# Patient Record
Sex: MALE | Race: WHITE | Employment: OTHER | URBAN - METROPOLITAN AREA
[De-identification: names, ages, dates, MRNs, and addresses within clinical notes are randomized per-mention and may not be internally consistent; named-entity substitution may affect disease eponyms.]

---

## 2017-01-19 ENCOUNTER — OFFICE VISIT (OUTPATIENT)
Dept: ORTHOPEDIC SURGERY | Age: 57
End: 2017-01-19

## 2017-01-19 VITALS
WEIGHT: 255 LBS | OXYGEN SATURATION: 99 % | SYSTOLIC BLOOD PRESSURE: 170 MMHG | TEMPERATURE: 98.1 F | RESPIRATION RATE: 16 BRPM | HEART RATE: 105 BPM | HEIGHT: 74 IN | DIASTOLIC BLOOD PRESSURE: 100 MMHG | BODY MASS INDEX: 32.73 KG/M2

## 2017-01-19 DIAGNOSIS — M48.061 LUMBAR STENOSIS: Primary | ICD-10-CM

## 2017-01-19 RX ORDER — METHOCARBAMOL 500 MG/1
500 TABLET, FILM COATED ORAL 4 TIMES DAILY
Qty: 120 TAB | Refills: 2 | Status: SHIPPED | OUTPATIENT
Start: 2017-01-19

## 2017-01-19 NOTE — PATIENT INSTRUCTIONS
Learning About Lumbar Epidural Steroid Injections  What is a lumbar epidural steroid injection? A doctor may give you a lumbar epidural steroid injection to try to decrease pain, tingling, or numbness in your back, buttock, or leg. These might be the result of a back or disc problem. The injection goes directly into your epidural space. This is the area in your back around your spinal cord. This injection may have both a local anesthetic and a steroid medicine. Or it may only have a steroid. Local anesthetic medicines numb your nerves right away for a short time. Steroids reduce swelling and pain. But they take a few days to start working. Some people get a series of these injections over weeks or months. How is a lumbar epidural done? The doctor may use an imaging test before or during your injection. This can be an MRI, a CT scan, or an X-ray. These tests can show where your nerve problems are. After finding the right spot, the doctor may inject a numbing medicine into the skin where you will get the steroid injection. Then he or she puts the needle for the steroid into the numbed area. You may feel some pressure. You could feel some stinging or burning during the injection. It takes about 10 to 15 minutes to get this injection. You will probably go home about 20 to 30 minutes after you get it. You will need someone to drive you home. What can you expect after a lumbar epidural?  If your injection had local anesthetic and a steroid, your legs may feel heavy or numb right after. You will probably be able to walk. But you may need to be extra careful. Take care not to lose your balance and be sure to follow your doctor's instructions. If your injection contained local anesthetic, you may feel better right away. But this pain relief will last only a few hours. Your pain will probably return. This is because the steroids have not started working yet.  Before the steroids start to work, your back may be sore for a few days. These injections don't always work. When they do, it takes 1 to 5 days. This pain relief can last for several days to a few months or longer. You may want to do less than normal for a few days. But you may also be able to return to your daily routine. Some people are dizzy or feel sick to their stomach after getting this injection. These symptoms usually do not last very long. If your pain is better, you may be able to keep doing your normal activities or physical therapy. But try not to overdo it, even if your back pain has improved a lot. If your pain is only a little better or if it comes back, your doctor may recommend another injection in a few weeks. If your pain has not changed, talk to your doctor about other treatment choices. Side effects from an epidural steroid injection include headache, fever, or infection. Serious side effects are rare. But they can include stroke, paralysis, or loss of vision. Follow-up care is a key part of your treatment and safety. Be sure to make and go to all appointments, and call your doctor if you are having problems. It's also a good idea to know your test results and keep a list of the medicines you take. Where can you learn more? Go to http://brian-tabitha.info/. Enter Astrid Mcmullen in the search box to learn more about \"Learning About Lumbar Epidural Steroid Injections. \"  Current as of: May 23, 2016  Content Version: 11.1  © 1643-7933 [employer]. Care instructions adapted under license by [employer] (which disclaims liability or warranty for this information). If you have questions about a medical condition or this instruction, always ask your healthcare professional. [place] [address] any warranty or liability for your use of this information.

## 2017-01-19 NOTE — PROGRESS NOTES
[address], [address]  [place]  Phone: [phone]  Fax: [phone]        Pawel Grimm  : 1960  PCP: Nkechi Canas MD  2017    PROGRESS NOTE      ASSESSMENT AND PLAN    Johann Copeland comes in to the office today for a prn f/u. He comes in today because he had a sudden flare up of lumbar pain 4 days ago that wraps around to his flanks bilaterally. The area is tender to palpation and he has pain with back flexion. This pain may be due to his moderately severe lumbar stenosis at L2-3. He was referred for an L1-2 interlaminar injection to see if it will provide relief. We discussed a referral to PT as another option but pt declined. Pt was also prescribed Robaxin 500 mg QID per his request.    Pt will f/u in 3 weeks or prn. Jaxson Santana was seen today for back pain. Diagnoses and all orders for this visit:    Lumbar stenosis  -     SCHEDULE SURGERY    Other orders  -     methocarbamol (ROBAXIN) 500 mg tablet; Take 1 Tab by mouth four (4) times daily. Follow-up Disposition:  Return in about 3 weeks (around 2017), or if symptoms worsen or fail to improve. HISTORY OF PRESENT ILLNESS  Johann Copeland is a 61 y.o. male. A&P / HPI from 2016:  Johann Copeland comes in to the office today for his cervical, thoracic, and lumbar MRI f/u.     Cervical MRI: Cervical intralaminar injections or an EMG/NCS of his arms were discussed as options for treating his cervical spine. Pt opted to wait for now as he does not seem to be experiencing too much discomfort in this region. He has stenosis in his cervical spine but it does not seem to be causing UMN signs at this time so surgery is not warranted at this point. Thoracic MRI does not reveal anything clinically significant.   Lumbar MRI shows potential mild arachnoiditis.     He was referred for an L1/L2 intralaminar injection as both a diagnostic and therapeutic measure for his lower back pain. If this relieves his pain, we may continue giving him more intralaminar injections periodically. If it does not relieve his pain, we may try the injections at a higher point in his spine. He was prescribed 5 days of Oxycodone 10 mg and 20 mg for his pain.     Pt will f/u in 3 weeks or prn. Updates from 01/19/17:  Pt presents for a prn f/u. He comes in today because he had a sudden flare up of lumbar pain 4 days ago that wraps around to his flanks bilaterally. The area is tender to palpation and he has pain with back flexion. This pain may be due to his moderately severe lumbar stenosis at L2-3. He is working with Dr. Arya Jett for pain management. PAST MEDICAL HISTORY   Past Medical History   Diagnosis Date    Arrhythmia 2004     SVT    Arthritis     Asthma      hx of, as a child, no inhaler    Chronic pain      lower back pain    Cirrhosis (HCC)     Depression     Encephalitis     GERD (gastroesophageal reflux disease)     Gout     Hepatitis C     Hypertension     Left rotator cuff tear     Lumbar postlaminectomy syndrome     Nausea & vomiting      pt denies nausea and vomiting after surgery today    Osteoarthritis     Osteoarthritis of left knee 10/3/2016    Weakness        Past Surgical History   Procedure Laterality Date    Hx acl reconstruction       right    Pr cardiac surg procedure unlist      Ablation of svt  2004    Pr chest surgery procedure unlisted      Hx back surgery       4 times    Pr transplant,prep donor liver, whole  February 2015    Hx lumbar fusion     . MEDICATIONS      Current Outpatient Prescriptions   Medication Sig Dispense Refill    oxyCODONE IR (OXY-IR) 30 mg immediate release tablet Take 1 Tab by mouth every six (6) hours. Max Daily Amount: 120 mg. 60 Tab 0    venlafaxine-SR (EFFEXOR XR) 150 mg capsule Take 150 mg by mouth daily.  Indications: ANXIETY WITH DEPRESSION      pregabalin (LYRICA) 75 mg capsule Take 75 mg by mouth two (2) times a day. Indications: pain      atorvastatin (LIPITOR) 10 mg tablet Take 10 mg by mouth two (2) times a day. Indications: HYPERCHOLESTEROLEMIA      CYCLOSPORINE, MODIFIED (GENGRAF PO) Take 100 mg by mouth two (2) times a day.  MYCOPHENOLATE MOFETIL (CELLCEPT PO) Take 750 mg by mouth two (2) times a day.  Omeprazole delayed release (PRILOSEC D/R) 20 mg tablet Take 20 mg by mouth daily. Indications: GASTROESOPHAGEAL REFLUX      allopurinol (ZYLOPRIM) 300 mg tablet Take 300 mg by mouth daily.  thiamine (B-1) 100 mg tablet Take 100 mg by mouth daily.  lisinopril (PRINIVIL, ZESTRIL) 10 mg tablet Take 10 mg by mouth daily. Indications: HYPERTENSION      clonazePAM (KLONOPIN) 0.5 mg tablet Take 1 mg by mouth two (2) times a day. Indications: anxiety          ALLERGIES    Allergies   Allergen Reactions    Adhesive Itching     Tears skin easily          SOCIAL HISTORY    Social History     Social History    Marital status:      Spouse name: N/A    Number of children: N/A    Years of education: N/A     Social History Main Topics    Smoking status: Never Smoker    Smokeless tobacco: Never Used    Alcohol use No      Comment: drank too much years ago; none since    Drug use: No      Comment: years ago - 1980's    Sexual activity: Yes     Other Topics Concern    None     Social History Narrative     Social History Narrative      Problem Relation Age of Onset    Heart Attack Father      1971    Heart Disease Father     Heart Disease Other     Cancer Brother          REVIEW OF SYSTEMS  Review of Systems   Constitutional: Negative for chills, diaphoresis, fever, malaise/fatigue and weight loss. Respiratory: Negative for shortness of breath. Cardiovascular: Negative for chest pain and leg swelling. Gastrointestinal: Negative for constipation, nausea and vomiting. Neurological: Negative for dizziness, tingling, seizures, loss of consciousness and headaches. Psychiatric/Behavioral: The patient does not have insomnia. PHYSICAL EXAMINATION  Visit Vitals    BP (!) 170/100 (BP 1 Location: Right arm, BP Patient Position: Sitting)    Pulse (!) 105    Temp 98.1 °F (36.7 °C) (Oral)    Resp 16    Ht 6' 2\" (1.88 m)    Wt 255 lb (115.7 kg)    SpO2 99%    BMI 32.74 kg/m2       Pain Assessment  9/2/2016   Location of Pain Back   Location Modifiers (No Data)   Severity of Pain 4   Quality of Pain Aching; Orpha Coppersmith; Other (Comment)   Quality of Pain Comment stabbing   Duration of Pain Persistent   Frequency of Pain Constant   Aggravating Factors -   Aggravating Factors Comment -   Limiting Behavior -   Relieving Factors -   Relieving Factors Comment -   Result of Injury No           Constitutional:  Well developed, well nourished, in no acute distress. Psychiatric: Affect and mood are appropriate. Integumentary: No rashes or abrasions noted on exposed areas. SPINE/MUSCULOSKELETAL EXAM    Lumbar spine:  No rash, ecchymosis, or gross obliquity.    No fasciculations.    No focal atrophy is noted.    No pain with hip ROM.    Range of motion is normal.   No tenderness to palpation at the sciatic notch.    SI joints non-tender.    Trochanters non tender.        Sensation in the bilateral legs grossly intact to light touch. Updates from 01/19/17:  Pain with back flexion. Diffuse tenderness to palpation in the lumbar region. MOTOR:      Biceps  Triceps Deltoids Wrist Ext Wrist Flex Hand Intrin   Right 5/5 5/5 5/5 5/5 5/5 5/5   Left 5/5 5/5 5/5 5/5 5/5 5/5             Hip Flex  Quads Hamstrings Ankle DF EHL Ankle PF   Right 5/5 5/5 5/5 5/5 5/5 5/5   Left 5/5 5/5 5/5 5/5 5/5 5/5     DTRs are 2+ biceps, triceps, brachioradialis, patella, and Achilles.      Negative Straight Leg raise.    Squat not tested.    No difficulty with tandem gait.        Ambulation with single point cane. FWB.       RADIOGRAPH  Cervical MRI images taken on 8/25/2016 personally reviewed with patient:  The bone marrow signal pattern is unremarkable. The cervical spine alignment is  unremarkable.      The visualized portions of the brain are unremarkable. The spinal cord signal  is normal. No abnormal intrathecal or epidural lesion is detected.      C2-3: Bulging disc-osteophyte along the right posterolateral aspect and to  lesser extent along the left posterior lateral aspect. No central canal  stenosis or foraminal stenosis.     C3-4: Mild bulging disc-osteophyte along posterior lateral aspect. Mild  posterior disc bulge. The AP diameter of the central canal is estimated at 0.9  cm. Mild facet hypertrophy. Minimal borderline left foraminal narrowing.      C4-5: Moderate disc bulge. Mild bulging disc-osteophyte along the left  posterior lateral aspect. No significant foraminal narrowing. The AP diameter  of the central canal is estimated at 0.9 cm.      C5-6: Broad-based protruding disc-osteophyte along the left posterolateral  aspect. Mild bulging disc-osteophyte along the right posterior lateral aspect.    Moderate to pronounced left foraminal narrowing. Mild right foraminal  narrowing. Mild disc bulge. There is a AP diameter of the central canal is  estimated at 0.9 cm.      C6-7: Broad-based disc bulge. Most pronounced along the right paracentral to  posterolateral aspect. Mild bulging disc-osteophyte along the left  posterolateral aspect. No significant foraminal narrowing on the left side.    Minimal narrowing on the right. The AP diameter of the central canal is  estimated at 0.9 cm.      C7-T1: Mild bilateral facet hypertrophy. No central canal stenosis or  foraminal stenosis.      IMPRESSION  IMPRESSION:      1. Long segment central canal narrowing. 2. Foraminal narrowing at multiple levels of the cervical spine. 3. Degenerative changes of the cervical spine with disc-osteophyte bulge and  disc-osteophyte protrusion.  Most pronounced at C5-6 on the left side.     Thoracic MRI images taken on 8/25/2016 personally reviewed with patient:  No compression deformity is observed at T5. Subtle endplate invagination is  observed at T4 and to lesser extent at T3. These findings appear stable dating  back to at least 2012.      The previously observed T11 superior aspect marrow edema has since resolved. No  convincing evidence for significant compression deformity is detected. There is  endplate invagination similar to the previous study. Decreased disc height is  observed at T10-11.      No acute subluxation is detected. No convincing evidence for acute fracture is  identified. No suspicious vertebral marrow edema is appreciated.      No abnormal intrathecal contents are noted.        At T2-3, a focal bulging disc-osteophyte is suggested along the right posterior  lateral aspect. Smaller subtle disc-osteophyte bulge is also suggested at T3-4  along the right posterior lateral aspect as well. Minimal disc bulge is  suggested at T10-11. No evidence for significant central canal stenosis or  foraminal stenosis is detected throughout the thoracic spine.      IMPRESSION  IMPRESSION:      1. Stable unchanged compression deformity at T5. Subtle endplate invagination  deformities, also unchanged.    2. Interval resolution of the T10 superior endplate and adjacent marrow edema. 3. Disc-osteophyte bulge without central canal or foraminal narrowing.     Lumbar MRI images taken on 8/25/2016 personally reviewed with patient:  Lumbar fusion with pedicle screws through L4, L5, and lumbarized S1. Incorporated bone plugs at the disc spaces. There is trace retrolisthesis of L3. Large Schmorl nodes in inferior L3 and L2 endplates with marrow edema.  No  compression fracture.      Conus shows a cystic lesion, with no enhancement measuring approximately 13 x 6  mm, benign cyst. Mild arachnoiditis is suspected at the descending nerve roots  in the thecal sac.      T12-L1: No disc herniation, central or foraminal stenosis.      L1-L2: Posterior lateral corner disc protrusion, more prominent on the left,  with mild effacement of left lateral recess. Moderate left foraminal narrowing  with no definite exiting nerve root compression. Patent right foramen.      L2-L3: Posterior disc bulge, flattening anterior thecal sac. Facet and  ligamentous hypertrophy. Posterior epidural fat present. AP canal measures 5 mm,  moderately severe central stenosis. No foraminal stenosis or exiting nerve root  compression. Small bilateral facet joint effusion.      L3-L4: Posterior disc bulge. Facet and ligamentous hypertrophy. AP canal  measures 8 mm, mild to moderate central stenosis. Moderate bilateral foraminal  narrowing with no definite exiting nerve root compression.      L4-L5: No disc material. No central stenosis. Mild foraminal narrowing with no  definite exiting nerve root compression.      L5-S1: No disc material. Posterior endplate spondylosis. No central stenosis. Mild foraminal narrowing with no exiting nerve root compression. There is  suggestion of conjoined left S1 and S2 nerve roots.      IMPRESSION  IMPRESSION: Lumbosacral fusion. Disc disease with central stenosis most severe  at L2-L3. Arachnoiditis. Additional findings as above.              reviewed.      This plan was reviewed with the patient and patient agrees. All questions were answered. More than half of this visit today was spent on counseling.      Written by Bert Monzon, as dictated by Dr. Rooney Essex, Dr. Debi Bravo, confirm that all documentation is accurate.

## 2017-01-31 ENCOUNTER — HOSPITAL ENCOUNTER (OUTPATIENT)
Dept: LAB | Age: 57
Discharge: HOME | End: 2017-01-31

## 2017-01-31 ENCOUNTER — HOSPITAL ENCOUNTER (OUTPATIENT)
Age: 57
Setting detail: OUTPATIENT SURGERY
Discharge: HOME | End: 2017-01-31
Attending: PHYSICAL MEDICINE & REHABILITATION | Admitting: PHYSICAL MEDICINE & REHABILITATION
Payer: MEDICARE

## 2017-01-31 ENCOUNTER — APPOINTMENT (OUTPATIENT)
Dept: GENERAL RADIOLOGY | Age: 57
End: 2017-01-31
Attending: PHYSICAL MEDICINE & REHABILITATION
Payer: MEDICARE

## 2017-01-31 ENCOUNTER — SURGERY (OUTPATIENT)
Age: 57
End: 2017-01-31

## 2017-01-31 VITALS
HEART RATE: 100 BPM | DIASTOLIC BLOOD PRESSURE: 87 MMHG | OXYGEN SATURATION: 96 % | WEIGHT: 255 LBS | SYSTOLIC BLOOD PRESSURE: 133 MMHG | HEIGHT: 74 IN | TEMPERATURE: 98.4 F | RESPIRATION RATE: 18 BRPM | BODY MASS INDEX: 32.73 KG/M2

## 2017-01-31 PROCEDURE — 99001 SPECIMEN HANDLING PT-LAB: CPT | Performed by: INTERNAL MEDICINE

## 2017-01-31 PROCEDURE — 74011250636 HC RX REV CODE- 250/636: Performed by: PHYSICAL MEDICINE & REHABILITATION

## 2017-01-31 PROCEDURE — 74011636320 HC RX REV CODE- 636/320: Performed by: PHYSICAL MEDICINE & REHABILITATION

## 2017-01-31 PROCEDURE — 77030014124 HC TY EPDRL BBMI -A: Performed by: PHYSICAL MEDICINE & REHABILITATION

## 2017-01-31 PROCEDURE — 77003 FLUOROGUIDE FOR SPINE INJECT: CPT

## 2017-01-31 PROCEDURE — 74011250637 HC RX REV CODE- 250/637: Performed by: PHYSICAL MEDICINE & REHABILITATION

## 2017-01-31 PROCEDURE — 76010000009 HC PAIN MGT 0 TO 30 MIN PROC: Performed by: PHYSICAL MEDICINE & REHABILITATION

## 2017-01-31 PROCEDURE — 74011000250 HC RX REV CODE- 250: Performed by: PHYSICAL MEDICINE & REHABILITATION

## 2017-01-31 RX ORDER — LIDOCAINE HYDROCHLORIDE 10 MG/ML
INJECTION, SOLUTION EPIDURAL; INFILTRATION; INTRACAUDAL; PERINEURAL AS NEEDED
Status: DISCONTINUED | OUTPATIENT
Start: 2017-01-31 | End: 2017-01-31 | Stop reason: HOSPADM

## 2017-01-31 RX ORDER — DIAZEPAM 5 MG/1
2.5-1 TABLET ORAL ONCE
Status: COMPLETED | OUTPATIENT
Start: 2017-01-31 | End: 2017-01-31

## 2017-01-31 RX ORDER — DEXAMETHASONE SODIUM PHOSPHATE 100 MG/10ML
INJECTION INTRAMUSCULAR; INTRAVENOUS AS NEEDED
Status: DISCONTINUED | OUTPATIENT
Start: 2017-01-31 | End: 2017-01-31 | Stop reason: HOSPADM

## 2017-01-31 RX ADMIN — DEXAMETHASONE SODIUM PHOSPHATE 30 MG: 10 INJECTION INTRAMUSCULAR; INTRAVENOUS at 15:45

## 2017-01-31 RX ADMIN — LIDOCAINE HYDROCHLORIDE 10 ML: 10 INJECTION, SOLUTION EPIDURAL; INFILTRATION; INTRACAUDAL; PERINEURAL at 15:45

## 2017-01-31 RX ADMIN — DIAZEPAM 5 MG: 5 TABLET ORAL at 15:05

## 2017-01-31 RX ADMIN — IOPAMIDOL 2 ML: 408 INJECTION, SOLUTION INTRATHECAL at 15:45

## 2017-01-31 NOTE — H&P (VIEW-ONLY)
[address]  [address], [address]  [place]  Phone: [phone]  Fax: [phone]        Linh Cherry  : 1960  PCP: Betty Hairston MD  2017    PROGRESS NOTE      ASSESSMENT AND PLAN    Apolinar Bañuelos comes in to the office today for a prn f/u. He comes in today because he had a sudden flare up of lumbar pain 4 days ago that wraps around to his flanks bilaterally. The area is tender to palpation and he has pain with back flexion. This pain may be due to his moderately severe lumbar stenosis at L2-3. He was referred for an L1-2 interlaminar injection to see if it will provide relief. We discussed a referral to PT as another option but pt declined. Pt was also prescribed Robaxin 500 mg QID per his request.    Pt will f/u in 3 weeks or prn. Ally Napoleon was seen today for back pain. Diagnoses and all orders for this visit:    Lumbar stenosis  -     SCHEDULE SURGERY    Other orders  -     methocarbamol (ROBAXIN) 500 mg tablet; Take 1 Tab by mouth four (4) times daily. Follow-up Disposition:  Return in about 3 weeks (around 2017), or if symptoms worsen or fail to improve. HISTORY OF PRESENT ILLNESS  Apolinar Bañuelos is a 61 y.o. male. A&P / HPI from 2016:  Apolinar Bañuelos comes in to the office today for his cervical, thoracic, and lumbar MRI f/u.     Cervical MRI: Cervical intralaminar injections or an EMG/NCS of his arms were discussed as options for treating his cervical spine. Pt opted to wait for now as he does not seem to be experiencing too much discomfort in this region. He has stenosis in his cervical spine but it does not seem to be causing UMN signs at this time so surgery is not warranted at this point. Thoracic MRI does not reveal anything clinically significant.   Lumbar MRI shows potential mild arachnoiditis.     He was referred for an L1/L2 intralaminar injection as both a diagnostic and therapeutic measure for his lower back pain. If this relieves his pain, we may continue giving him more intralaminar injections periodically. If it does not relieve his pain, we may try the injections at a higher point in his spine. He was prescribed 5 days of Oxycodone 10 mg and 20 mg for his pain.     Pt will f/u in 3 weeks or prn. Updates from 01/19/17:  Pt presents for a prn f/u. He comes in today because he had a sudden flare up of lumbar pain 4 days ago that wraps around to his flanks bilaterally. The area is tender to palpation and he has pain with back flexion. This pain may be due to his moderately severe lumbar stenosis at L2-3. He is working with Dr. Tavares Sanchez for pain management. PAST MEDICAL HISTORY   Past Medical History   Diagnosis Date    Arrhythmia 2004     SVT    Arthritis     Asthma      hx of, as a child, no inhaler    Chronic pain      lower back pain    Cirrhosis (HCC)     Depression     Encephalitis     GERD (gastroesophageal reflux disease)     Gout     Hepatitis C     Hypertension     Left rotator cuff tear     Lumbar postlaminectomy syndrome     Nausea & vomiting      pt denies nausea and vomiting after surgery today    Osteoarthritis     Osteoarthritis of left knee 10/3/2016    Weakness        Past Surgical History   Procedure Laterality Date    Hx acl reconstruction       right    Pr cardiac surg procedure unlist      Ablation of svt  2004    Pr chest surgery procedure unlisted      Hx back surgery       4 times    Pr transplant,prep donor liver, whole  February 2015    Hx lumbar fusion     . MEDICATIONS      Current Outpatient Prescriptions   Medication Sig Dispense Refill    oxyCODONE IR (OXY-IR) 30 mg immediate release tablet Take 1 Tab by mouth every six (6) hours. Max Daily Amount: 120 mg. 60 Tab 0    venlafaxine-SR (EFFEXOR XR) 150 mg capsule Take 150 mg by mouth daily.  Indications: ANXIETY WITH DEPRESSION      pregabalin (LYRICA) 75 mg capsule Take 75 mg by mouth two (2) times a day. Indications: pain      atorvastatin (LIPITOR) 10 mg tablet Take 10 mg by mouth two (2) times a day. Indications: HYPERCHOLESTEROLEMIA      CYCLOSPORINE, MODIFIED (GENGRAF PO) Take 100 mg by mouth two (2) times a day.  MYCOPHENOLATE MOFETIL (CELLCEPT PO) Take 750 mg by mouth two (2) times a day.  Omeprazole delayed release (PRILOSEC D/R) 20 mg tablet Take 20 mg by mouth daily. Indications: GASTROESOPHAGEAL REFLUX      allopurinol (ZYLOPRIM) 300 mg tablet Take 300 mg by mouth daily.  thiamine (B-1) 100 mg tablet Take 100 mg by mouth daily.  lisinopril (PRINIVIL, ZESTRIL) 10 mg tablet Take 10 mg by mouth daily. Indications: HYPERTENSION      clonazePAM (KLONOPIN) 0.5 mg tablet Take 1 mg by mouth two (2) times a day. Indications: anxiety          ALLERGIES    Allergies   Allergen Reactions    Adhesive Itching     Tears skin easily          SOCIAL HISTORY    Social History     Social History    Marital status:      Spouse name: N/A    Number of children: N/A    Years of education: N/A     Social History Main Topics    Smoking status: Never Smoker    Smokeless tobacco: Never Used    Alcohol use No      Comment: drank too much years ago; none since    Drug use: No      Comment: years ago - 1980's    Sexual activity: Yes     Other Topics Concern    None     Social History Narrative     Social History Narrative      Problem Relation Age of Onset    Heart Attack Father      1971    Heart Disease Father     Heart Disease Other     Cancer Brother          REVIEW OF SYSTEMS  Review of Systems   Constitutional: Negative for chills, diaphoresis, fever, malaise/fatigue and weight loss. Respiratory: Negative for shortness of breath. Cardiovascular: Negative for chest pain and leg swelling. Gastrointestinal: Negative for constipation, nausea and vomiting. Neurological: Negative for dizziness, tingling, seizures, loss of consciousness and headaches. Psychiatric/Behavioral: The patient does not have insomnia. PHYSICAL EXAMINATION  Visit Vitals    BP (!) 170/100 (BP 1 Location: Right arm, BP Patient Position: Sitting)    Pulse (!) 105    Temp 98.1 °F (36.7 °C) (Oral)    Resp 16    Ht 6' 2\" (1.88 m)    Wt 255 lb (115.7 kg)    SpO2 99%    BMI 32.74 kg/m2       Pain Assessment  9/2/2016   Location of Pain Back   Location Modifiers (No Data)   Severity of Pain 4   Quality of Pain Aching; Leighton Shank; Other (Comment)   Quality of Pain Comment stabbing   Duration of Pain Persistent   Frequency of Pain Constant   Aggravating Factors -   Aggravating Factors Comment -   Limiting Behavior -   Relieving Factors -   Relieving Factors Comment -   Result of Injury No           Constitutional:  Well developed, well nourished, in no acute distress. Psychiatric: Affect and mood are appropriate. Integumentary: No rashes or abrasions noted on exposed areas. SPINE/MUSCULOSKELETAL EXAM    Lumbar spine:  No rash, ecchymosis, or gross obliquity.    No fasciculations.    No focal atrophy is noted.    No pain with hip ROM.    Range of motion is normal.   No tenderness to palpation at the sciatic notch.    SI joints non-tender.    Trochanters non tender.        Sensation in the bilateral legs grossly intact to light touch. Updates from 01/19/17:  Pain with back flexion. Diffuse tenderness to palpation in the lumbar region. MOTOR:      Biceps  Triceps Deltoids Wrist Ext Wrist Flex Hand Intrin   Right 5/5 5/5 5/5 5/5 5/5 5/5   Left 5/5 5/5 5/5 5/5 5/5 5/5             Hip Flex  Quads Hamstrings Ankle DF EHL Ankle PF   Right 5/5 5/5 5/5 5/5 5/5 5/5   Left 5/5 5/5 5/5 5/5 5/5 5/5     DTRs are 2+ biceps, triceps, brachioradialis, patella, and Achilles.      Negative Straight Leg raise.    Squat not tested.    No difficulty with tandem gait.        Ambulation with single point cane. FWB.       RADIOGRAPH  Cervical MRI images taken on 8/25/2016 personally reviewed with patient:  The bone marrow signal pattern is unremarkable. The cervical spine alignment is  unremarkable.      The visualized portions of the brain are unremarkable. The spinal cord signal  is normal. No abnormal intrathecal or epidural lesion is detected.      C2-3: Bulging disc-osteophyte along the right posterolateral aspect and to  lesser extent along the left posterior lateral aspect. No central canal  stenosis or foraminal stenosis.     C3-4: Mild bulging disc-osteophyte along posterior lateral aspect. Mild  posterior disc bulge. The AP diameter of the central canal is estimated at 0.9  cm. Mild facet hypertrophy. Minimal borderline left foraminal narrowing.      C4-5: Moderate disc bulge. Mild bulging disc-osteophyte along the left  posterior lateral aspect. No significant foraminal narrowing. The AP diameter  of the central canal is estimated at 0.9 cm.      C5-6: Broad-based protruding disc-osteophyte along the left posterolateral  aspect. Mild bulging disc-osteophyte along the right posterior lateral aspect.    Moderate to pronounced left foraminal narrowing. Mild right foraminal  narrowing. Mild disc bulge. There is a AP diameter of the central canal is  estimated at 0.9 cm.      C6-7: Broad-based disc bulge. Most pronounced along the right paracentral to  posterolateral aspect. Mild bulging disc-osteophyte along the left  posterolateral aspect. No significant foraminal narrowing on the left side.    Minimal narrowing on the right. The AP diameter of the central canal is  estimated at 0.9 cm.      C7-T1: Mild bilateral facet hypertrophy. No central canal stenosis or  foraminal stenosis.      IMPRESSION  IMPRESSION:      1. Long segment central canal narrowing. 2. Foraminal narrowing at multiple levels of the cervical spine. 3. Degenerative changes of the cervical spine with disc-osteophyte bulge and  disc-osteophyte protrusion.  Most pronounced at C5-6 on the left side.     Thoracic MRI images taken on 8/25/2016 personally reviewed with patient:  No compression deformity is observed at T5. Subtle endplate invagination is  observed at T4 and to lesser extent at T3. These findings appear stable dating  back to at least 2012.      The previously observed T11 superior aspect marrow edema has since resolved. No  convincing evidence for significant compression deformity is detected. There is  endplate invagination similar to the previous study. Decreased disc height is  observed at T10-11.      No acute subluxation is detected. No convincing evidence for acute fracture is  identified. No suspicious vertebral marrow edema is appreciated.      No abnormal intrathecal contents are noted.        At T2-3, a focal bulging disc-osteophyte is suggested along the right posterior  lateral aspect. Smaller subtle disc-osteophyte bulge is also suggested at T3-4  along the right posterior lateral aspect as well. Minimal disc bulge is  suggested at T10-11. No evidence for significant central canal stenosis or  foraminal stenosis is detected throughout the thoracic spine.      IMPRESSION  IMPRESSION:      1. Stable unchanged compression deformity at T5. Subtle endplate invagination  deformities, also unchanged.    2. Interval resolution of the T10 superior endplate and adjacent marrow edema. 3. Disc-osteophyte bulge without central canal or foraminal narrowing.     Lumbar MRI images taken on 8/25/2016 personally reviewed with patient:  Lumbar fusion with pedicle screws through L4, L5, and lumbarized S1. Incorporated bone plugs at the disc spaces. There is trace retrolisthesis of L3. Large Schmorl nodes in inferior L3 and L2 endplates with marrow edema.  No  compression fracture.      Conus shows a cystic lesion, with no enhancement measuring approximately 13 x 6  mm, benign cyst. Mild arachnoiditis is suspected at the descending nerve roots  in the thecal sac.      T12-L1: No disc herniation, central or foraminal stenosis.      L1-L2: Posterior lateral corner disc protrusion, more prominent on the left,  with mild effacement of left lateral recess. Moderate left foraminal narrowing  with no definite exiting nerve root compression. Patent right foramen.      L2-L3: Posterior disc bulge, flattening anterior thecal sac. Facet and  ligamentous hypertrophy. Posterior epidural fat present. AP canal measures 5 mm,  moderately severe central stenosis. No foraminal stenosis or exiting nerve root  compression. Small bilateral facet joint effusion.      L3-L4: Posterior disc bulge. Facet and ligamentous hypertrophy. AP canal  measures 8 mm, mild to moderate central stenosis. Moderate bilateral foraminal  narrowing with no definite exiting nerve root compression.      L4-L5: No disc material. No central stenosis. Mild foraminal narrowing with no  definite exiting nerve root compression.      L5-S1: No disc material. Posterior endplate spondylosis. No central stenosis. Mild foraminal narrowing with no exiting nerve root compression. There is  suggestion of conjoined left S1 and S2 nerve roots.      IMPRESSION  IMPRESSION: Lumbosacral fusion. Disc disease with central stenosis most severe  at L2-L3. Arachnoiditis. Additional findings as above.              reviewed.      This plan was reviewed with the patient and patient agrees. All questions were answered. More than half of this visit today was spent on counseling.      Written by Ramya Subramanian, as dictated by Dr. Milly Rendon, Dr. Yuly Sosa, confirm that all documentation is accurate.

## 2017-01-31 NOTE — DISCHARGE INSTRUCTIONS
[facility]   (KENZIE)  Dr. Don Bradshaw, Dr. Feliciana Spatz, Dr. Esteban Nagel not drive a car, operate heavy machinery or dangerous equipment for 24 hours. * Activity as tolerated; rest for the remainder of the day. * Resume pre-block medications including those for your family doctor. * Do not drink alcoholic beverages for 24 hours. Alcohol and the medications you have received may interact and cause an adverse reaction. * You may feel better this evening and worse tomorrow, as the numbing medications wears off and the steroid has yet to begin to work. After 48 hrs the steroid should begin to release bringing you relief. * You may shower this evening and remove any bandages. * Avoid hot tubs and heating pads for 24 hours. You may use cold packs on the procedure site as tolerated for the first 24 hours. * If a headache develops, drink plenty of fluids and rest.  Take over the counter medications for headache if needed. If the headache continues longer than 24 hours, call MD at the [address] [place] [address]. [phone]    * Continue taking pain medications as needed. * You may resume your regular diet if tolerated. Otherwise, start with sips of water and advance slowly. * If Diabetic: check your blood sugar three times a day for the next 3 days. If your sugar is greater than 300 call your family doctor. If your sugar is greater than 400, have someone transport you to the nearest Emergency Room. * If you experience any of the following problems, Please Call the [address] [place] [address] at [phone].         * Shortness of Breath    * Fever of 101 or higher    * Nausea / Vomiting    * Severe Headache    * Weakness or numbness in arms or legs that is not      resolving    * Prolonged increase in pain greater than 4 days      DISCHARGE SUMMARY from Nurse      PATIENT INSTRUCTIONS:    After oral sedation, for 24 hours or while taking prescription Narcotics:  · Limit your activities  · Do not drive and operate hazardous machinery  · Do not make important personal or business decisions  · Do  not drink alcoholic beverages  · If you have not urinated within 8 hours after discharge, please contact your surgeon on call. Report the following to your surgeon:  · Excessive pain, swelling, redness or odor of or around the surgical area  · Temperature over 101  · Nausea and vomiting lasting longer than 4 hours or if unable to take medications  · Any signs of decreased circulation or nerve impairment to extremity: change in color, persistent  numbness, tingling, coldness or increase pain  · Any questions            What to do at Home:  Recommended activity: Activity as tolerated, NO DRIVING FOR 12 Hours post injection          *  Please give a list of your current medications to your Primary Care Provider. *  Please update this list whenever your medications are discontinued, doses are      changed, or new medications (including over-the-counter products) are added. *  Please carry medication information at all times in case of emergency situations. These are general instructions for a healthy lifestyle:    No smoking/ No tobacco products/ Avoid exposure to second hand smoke    Surgeon General's Warning:  Quitting smoking now greatly reduces serious risk to your health. Obesity, smoking, and sedentary lifestyle greatly increases your risk for illness    A healthy diet, regular physical exercise & weight monitoring are important for maintaining a healthy lifestyle    You may be retaining fluid if you have a history of heart failure or if you experience any of the following symptoms:  Weight gain of 3 pounds or more overnight or 5 pounds in a week, increased swelling in our hands or feet or shortness of breath while lying flat in bed.   Please call your doctor as soon as you notice any of these symptoms; do not wait until your next office visit. Recognize signs and symptoms of STROKE:    F-face looks uneven    A-arms unable to move or move unevenly    S-speech slurred or non-existent    T-time-call 911 as soon as signs and symptoms begin-DO NOT go       Back to bed or wait to see if you get better-TIME IS BRAIN.

## 2017-01-31 NOTE — INTERVAL H&P NOTE
H&P Update:  Dhara Hinton was seen and briefly examined. History and physical has been reviewed.  . There have been no significant clinical changes since the completion of the originally dated History and Physical.    Signed By: Meche Llanes MD     January 31, 2017 3:39 PM

## 2017-01-31 NOTE — INTERVAL H&P NOTE
H&P Update:  Honorio Johnson was seen and briefly examined. History and physical has been reviewed.   There have been no significant clinical changes since the completion of the originally dated History and Physical.    Signed By: [address], MD     January 31, 2017 3:24 PM

## 2017-02-20 ENCOUNTER — HOSPITAL ENCOUNTER (OUTPATIENT)
Dept: LAB | Age: 57
Discharge: HOME | End: 2017-02-20

## 2017-02-20 PROCEDURE — 99001 SPECIMEN HANDLING PT-LAB: CPT

## 2017-02-27 ENCOUNTER — OFFICE VISIT (OUTPATIENT)
Dept: ORTHOPEDIC SURGERY | Age: 57
End: 2017-02-27

## 2017-02-27 VITALS
BODY MASS INDEX: 32.73 KG/M2 | TEMPERATURE: 98.7 F | OXYGEN SATURATION: 99 % | RESPIRATION RATE: 18 BRPM | HEIGHT: 74 IN | SYSTOLIC BLOOD PRESSURE: 141 MMHG | WEIGHT: 255 LBS | DIASTOLIC BLOOD PRESSURE: 96 MMHG | HEART RATE: 90 BPM

## 2017-02-27 DIAGNOSIS — M48.061 LUMBAR STENOSIS: Primary | ICD-10-CM

## 2017-02-27 DIAGNOSIS — M79.18 MYOFASCIAL PAIN: ICD-10-CM

## 2017-02-27 NOTE — PROGRESS NOTES
[address]  [address], [address]  [place]  Phone: [phone]  Fax: [phone]        Juan Antonio Colon  : 1960  PCP: Kyara Gaitan MD  2017    PROGRESS NOTE      ASSESSMENT AND PLAN    Lupis Thompson comes in to the office today for an L1-2 interlaminar injection f/u. He found great relief from the injection and is experiencing decreased pain which is much more manageable compared to his last visit. Pt was informed about PT as an option to relieve the myofascial component of his lumbar pain but he deferred. We also discussed his left knee pain and potential postsurgical complications. Pt will f/u prn. Sharla Saxena was seen today for back pain. Diagnoses and all orders for this visit:    Lumbar stenosis    Myofascial pain       Follow-up Disposition:  Return if symptoms worsen or fail to improve. HISTORY OF PRESENT ILLNESS  Lupis Thompson is a 61 y.o. male. A&P / HPI from 2017:  Lupis Thompson comes in to the office today for a prn f/u. He comes in today because he had a sudden flare up of lumbar pain 4 days ago that wraps around to his flanks bilaterally. The area is tender to palpation and he has pain with back flexion. This pain may be due to his moderately severe lumbar stenosis at L2-3.      He was referred for an L1-2 interlaminar injection to see if it will provide relief. We discussed a referral to PT as another option but pt declined. Pt was also prescribed Robaxin 500 mg QID per his request.     Pt will f/u in 3 weeks or prn. Updates from 17:  Pt presents for an L1-2 interlaminar injection f/u. He found great relief from the injection and is experiencing decreased pain which is much more manageable compared to his last visit. He has taken Robaxin 500 mg QID without relief.       PAST MEDICAL HISTORY   Past Medical History:   Diagnosis Date    Arrhythmia     SVT    Arthritis     Asthma     hx of, as a child, no inhaler    Chronic pain     lower back pain    Cirrhosis (HCC)     Depression     Encephalitis     GERD (gastroesophageal reflux disease)     Gout     Hepatitis C     Hypertension     Left rotator cuff tear     Lumbar postlaminectomy syndrome     Nausea & vomiting     pt denies nausea and vomiting after surgery today    Osteoarthritis     Osteoarthritis of left knee 10/3/2016    Weakness        Past Surgical History:   Procedure Laterality Date    ABLATION OF SVT  2004    CARDIAC SURG PROCEDURE UNLIST      CHEST SURGERY PROCEDURE UNLISTED      HX ACL RECONSTRUCTION      right    HX BACK SURGERY      4 times    HX LIVER TRANSPLANT  2015    HX LUMBAR FUSION      TRANSPLANT,PREP DONOR LIVER, WHOLE  February 2015   . MEDICATIONS      Current Outpatient Prescriptions   Medication Sig Dispense Refill    methocarbamol (ROBAXIN) 500 mg tablet Take 1 Tab by mouth four (4) times daily. 120 Tab 2    oxyCODONE IR (OXY-IR) 30 mg immediate release tablet Take 1 Tab by mouth every six (6) hours. Max Daily Amount: 120 mg. 60 Tab 0    venlafaxine-SR (EFFEXOR XR) 150 mg capsule Take 150 mg by mouth daily. Indications: ANXIETY WITH DEPRESSION      pregabalin (LYRICA) 75 mg capsule Take 75 mg by mouth two (2) times a day. Indications: pain      atorvastatin (LIPITOR) 10 mg tablet Take 10 mg by mouth two (2) times a day. Indications: HYPERCHOLESTEROLEMIA      clonazePAM (KLONOPIN) 0.5 mg tablet Take 1 mg by mouth two (2) times a day. Indications: anxiety      CYCLOSPORINE, MODIFIED (GENGRAF PO) Take 100 mg by mouth two (2) times a day.  MYCOPHENOLATE MOFETIL (CELLCEPT PO) Take 750 mg by mouth two (2) times a day.  Omeprazole delayed release (PRILOSEC D/R) 20 mg tablet Take 20 mg by mouth daily. Indications: GASTROESOPHAGEAL REFLUX      allopurinol (ZYLOPRIM) 300 mg tablet Take 300 mg by mouth daily.  thiamine (B-1) 100 mg tablet Take 100 mg by mouth daily.       lisinopril (PRINIVIL, ZESTRIL) 10 mg tablet Take 10 mg by mouth daily. Indications: HYPERTENSION          ALLERGIES    Allergies   Allergen Reactions    Adhesive Itching     Tears skin easily          SOCIAL HISTORY    Social History     Social History    Marital status:      Spouse name: N/A    Number of children: N/A    Years of education: N/A     Social History Main Topics    Smoking status: Never Smoker    Smokeless tobacco: Never Used    Alcohol use No      Comment: drank too much years ago; none since    Drug use: No      Comment: years ago - 1980's    Sexual activity: Yes     Other Topics Concern    None     Social History Narrative       FAMILY HISTORY    Family History   Problem Relation Age of Onset    Heart Attack Father      1971    Heart Disease Father     Heart Disease Other     Cancer Brother          REVIEW OF SYSTEMS  Review of Systems   Constitutional: Negative for chills, diaphoresis, fever, malaise/fatigue and weight loss. Respiratory: Negative for shortness of breath. Cardiovascular: Negative for chest pain and leg swelling. Gastrointestinal: Negative for constipation, nausea and vomiting. Neurological: Negative for dizziness, tingling, seizures, loss of consciousness and headaches. Psychiatric/Behavioral: The patient does not have insomnia. PHYSICAL EXAMINATION  Visit Vitals    BP (!) 141/96    Pulse 90    Temp 98.7 °F (37.1 °C) (Oral)    Resp 18    Ht 6' 2\" (1.88 m)    Wt 255 lb (115.7 kg)    SpO2 99%    BMI 32.74 kg/m2       Pain Assessment  2/27/2017   Location of Pain Back   Location Modifiers -   Severity of Pain 4   Quality of Pain Aching   Quality of Pain Comment -   Duration of Pain -   Frequency of Pain Constant   Aggravating Factors -   Aggravating Factors Comment -   Limiting Behavior -   Relieving Factors -   Relieving Factors Comment -   Result of Injury No           Constitutional:  Well developed, well nourished, in no acute distress. Psychiatric: Affect and mood are appropriate. Integumentary: No rashes or abrasions noted on exposed areas. SPINE/MUSCULOSKELETAL EXAM    Lumbar spine:  No rash, ecchymosis, or gross obliquity.    No fasciculations.    No focal atrophy is noted.    No pain with hip ROM.    Range of motion is normal.   No tenderness to palpation at the sciatic notch.    SI joints non-tender.    Trochanters non tender.        Sensation in the bilateral legs grossly intact to light touch.     Updates from 01/19/17:  Pain with back flexion. Diffuse tenderness to palpation in the lumbar region. MOTOR:      Biceps  Triceps Deltoids Wrist Ext Wrist Flex Hand Intrin   Right 5/5 5/5 5/5 5/5 5/5 5/5   Left 5/5 5/5 5/5 5/5 5/5 5/5             Hip Flex  Quads Hamstrings Ankle DF EHL Ankle PF   Right 5/5 5/5 5/5 5/5 5/5 5/5   Left 5/5 5/5 5/5 5/5 5/5 5/5     DTRs are 2+ biceps, triceps, brachioradialis, patella, and Achilles.      Negative Straight Leg raise.    Squat not tested.    No difficulty with tandem gait.        Ambulation with single point cane. FWB.     RADIOGRAPH  Cervical MRI images taken on 8/25/2016 personally reviewed with patient:  The bone marrow signal pattern is unremarkable. The cervical spine alignment is  unremarkable.      The visualized portions of the brain are unremarkable. The spinal cord signal  is normal. No abnormal intrathecal or epidural lesion is detected.      C2-3: Bulging disc-osteophyte along the right posterolateral aspect and to  lesser extent along the left posterior lateral aspect. No central canal  stenosis or foraminal stenosis.     C3-4: Mild bulging disc-osteophyte along posterior lateral aspect. Mild  posterior disc bulge. The AP diameter of the central canal is estimated at 0.9  cm. Mild facet hypertrophy. Minimal borderline left foraminal narrowing.      C4-5: Moderate disc bulge. Mild bulging disc-osteophyte along the left  posterior lateral aspect.  No significant foraminal narrowing. The AP diameter  of the central canal is estimated at 0.9 cm.      C5-6: Broad-based protruding disc-osteophyte along the left posterolateral  aspect. Mild bulging disc-osteophyte along the right posterior lateral aspect.    Moderate to pronounced left foraminal narrowing. Mild right foraminal  narrowing. Mild disc bulge. There is a AP diameter of the central canal is  estimated at 0.9 cm.      C6-7: Broad-based disc bulge. Most pronounced along the right paracentral to  posterolateral aspect. Mild bulging disc-osteophyte along the left  posterolateral aspect. No significant foraminal narrowing on the left side.    Minimal narrowing on the right. The AP diameter of the central canal is  estimated at 0.9 cm.      C7-T1: Mild bilateral facet hypertrophy. No central canal stenosis or  foraminal stenosis.      IMPRESSION  IMPRESSION:      1. Long segment central canal narrowing. 2. Foraminal narrowing at multiple levels of the cervical spine. 3. Degenerative changes of the cervical spine with disc-osteophyte bulge and  disc-osteophyte protrusion. Most pronounced at C5-6 on the left side.      Thoracic MRI images taken on 8/25/2016 personally reviewed with patient:  No compression deformity is observed at T5. Subtle endplate invagination is  observed at T4 and to lesser extent at T3. These findings appear stable dating  back to at least 2012.      The previously observed T11 superior aspect marrow edema has since resolved. No  convincing evidence for significant compression deformity is detected. There is  endplate invagination similar to the previous study. Decreased disc height is  observed at T10-11.      No acute subluxation is detected. No convincing evidence for acute fracture is  identified. No suspicious vertebral marrow edema is appreciated.      No abnormal intrathecal contents are noted.        At T2-3, a focal bulging disc-osteophyte is suggested along the right posterior  lateral aspect.  Smaller subtle disc-osteophyte bulge is also suggested at T3-4  along the right posterior lateral aspect as well. Minimal disc bulge is  suggested at T10-11. No evidence for significant central canal stenosis or  foraminal stenosis is detected throughout the thoracic spine.      IMPRESSION  IMPRESSION:      1. Stable unchanged compression deformity at T5. Subtle endplate invagination  deformities, also unchanged.    2. Interval resolution of the T10 superior endplate and adjacent marrow edema. 3. Disc-osteophyte bulge without central canal or foraminal narrowing.      Lumbar MRI images taken on 8/25/2016 personally reviewed with patient:  Lumbar fusion with pedicle screws through L4, L5, and lumbarized S1. Incorporated bone plugs at the disc spaces. There is trace retrolisthesis of L3. Large Schmorl nodes in inferior L3 and L2 endplates with marrow edema. No  compression fracture.      Conus shows a cystic lesion, with no enhancement measuring approximately 13 x 6  mm, benign cyst. Mild arachnoiditis is suspected at the descending nerve roots  in the thecal sac.      T12-L1: No disc herniation, central or foraminal stenosis.      L1-L2: Posterior lateral corner disc protrusion, more prominent on the left,  with mild effacement of left lateral recess. Moderate left foraminal narrowing  with no definite exiting nerve root compression. Patent right foramen.      L2-L3: Posterior disc bulge, flattening anterior thecal sac. Facet and  ligamentous hypertrophy. Posterior epidural fat present. AP canal measures 5 mm,  moderately severe central stenosis. No foraminal stenosis or exiting nerve root  compression. Small bilateral facet joint effusion.      L3-L4: Posterior disc bulge. Facet and ligamentous hypertrophy. AP canal  measures 8 mm, mild to moderate central stenosis. Moderate bilateral foraminal  narrowing with no definite exiting nerve root compression.      L4-L5: No disc material. No central stenosis.  Mild foraminal narrowing with no  definite exiting nerve root compression.      L5-S1: No disc material. Posterior endplate spondylosis. No central stenosis. Mild foraminal narrowing with no exiting nerve root compression. There is  suggestion of conjoined left S1 and S2 nerve roots.      IMPRESSION  IMPRESSION: Lumbosacral fusion. Disc disease with central stenosis most severe  at L2-L3. Arachnoiditis. Additional findings as above.               reviewed.      This plan was reviewed with the patient and patient agrees. All questions were answered. More than half of this visit today was spent on counseling.      Written by Renata Perez, as dictated by Dr. Taurus Mcgarry, Dr. Bubba De Dios, confirm that all documentation is accurate.

## 2017-02-27 NOTE — MR AVS SNAPSHOT
Visit Information Date & Time Provider Department Dept. Phone Encounter #  
 2/27/2017  3:30 PM Charline Arvizu MD [facility] [phone] [id number] Follow-up Instructions Return if symptoms worsen or fail to improve. Routing History Your Appointments 2/27/2017  3:30 PM  
Follow Up with Charline Arvizu MD  
[address], [address] [facility] [address]) Appt Note: BLOCK FU; MAJOR 1/31/17; LVM FOR PT TO CALL AND R/S; PT R/S FROM 2/21/17  
 [address] [facility] [id number] [phone]  
  
   
 [address] [facility] [address] Upcoming Health Maintenance Date Due Hepatitis C Screening 1960 Pneumococcal 19-64 Highest Risk (1 of 3 - PCV13) 5/10/1978 DTaP/Tdap/Td series (1 - Tdap) 5/10/1980 FOBT Q 1 YEAR AGE 50-75 5/10/2009 Allergies as of 2/27/2017  Review Complete On: 2/27/2017 By: Taye Mcgee LPN Severity Noted Reaction Type Reactions Adhesive  01/12/2016    Itching Tears skin easily Current Immunizations  Never Reviewed Name Date Influenza Vaccine 10/1/2016 Not reviewed this visit You Were Diagnosed With   
  
 Codes Comments Lumbar stenosis    -  Primary ICD-10-CM: M48.06 
ICD-9-CM: 724.02 Myofascial pain     ICD-10-CM: M79.1 ICD-9-CM: 729.1 Vitals BP  
  
  
  
  
  
 (!) 141/96 BMI and BSA Data Body Mass Index Body Surface Area 32.74 kg/m 2 2.46 m 2 Preferred Pharmacy Pharmacy Name Phone [facility] [address], [address] [phone] Your Updated Medication List  
  
   
This list is accurate as of: 2/27/17  2:23 PM.  Always use your most recent med list.  
  
  
  
  
 allopurinol 300 mg tablet Commonly known as:  Rhona Bibber Take 300 mg by mouth daily. atorvastatin 10 mg tablet Commonly known as:  LIPITOR Take 10 mg by mouth two (2) times a day. Indications: HYPERCHOLESTEROLEMIA  
  
 CELLCEPT PO Take 750 mg by mouth two (2) times a day. clonazePAM 0.5 mg tablet Commonly known as:  Willeen Narrow Take 1 mg by mouth two (2) times a day. Indications: anxiety EFFEXOR  mg capsule Generic drug:  venlafaxine-SR Take 150 mg by mouth daily. Indications: ANXIETY WITH DEPRESSION  
  
 GENGRAF PO Take 100 mg by mouth two (2) times a day. lisinopril 10 mg tablet Commonly known as:  Shelby Armen Take 10 mg by mouth daily. Indications: HYPERTENSION  
  
 LYRICA 75 mg capsule Generic drug:  pregabalin Take 75 mg by mouth two (2) times a day. Indications: pain  
  
 methocarbamol 500 mg tablet Commonly known as:  ROBAXIN Take 1 Tab by mouth four (4) times daily. Omeprazole delayed release 20 mg tablet Commonly known as:  PRILOSEC D/R Take 20 mg by mouth daily. Indications: GASTROESOPHAGEAL REFLUX  
  
 oxyCODONE IR 30 mg immediate release tablet Commonly known as:  OXY-IR Take 1 Tab by mouth every six (6) hours. Max Daily Amount: 120 mg.  
  
 thiamine 100 mg tablet Commonly known as:  B-1 Take 100 mg by mouth daily. Follow-up Instructions Return if symptoms worsen or fail to improve. Patient Instructions Lumbar Stenosis: Care Instructions Your Care Instructions - Stenosis in the spine is a narrowing of the canal that is around the spinal cord and nerve roots in your back. It can happen as part of aging. Sometimes bone and other tissue grow into this canal and press on the spinal nerves. This can cause pain, numbness, and weakness. When it happens in the lower part of your back, it is called lumbar stenosis. Lumbar stenosis can cause problems in the legs, feet, and rear end (buttocks). You may be able to relieve symptoms of lumbar stenosis with pain medicine. Your doctor may suggest physical therapy and exercises to keep your spine strong and flexible. Some people try cortisone shots to reduce swelling. If pain and numbness in your legs are still so bad that you cannot do your normal activities, you may need surgery. Follow-up care is a key part of your treatment and safety. Be sure to make and go to all appointments, and call your doctor if you are having problems. It's also a good idea to know your test results and keep a list of the medicines you take. How can you care for yourself at home? · Take an over-the-counter pain medicine, such as acetaminophen (Tylenol), ibuprofen (Advil, Motrin), or naproxen (Aleve). Read and follow all instructions on the label. · Do not take two or more pain medicines at the same time unless the doctor told you to. Many pain medicines have acetaminophen, which is Tylenol. Too much acetaminophen (Tylenol) can be harmful. · Stay at a healthy weight. Being overweight puts extra strain on your spine. · Change positions often when you sit or stand. This can ease pain. For example, lean forward. This may reduce pressure on the spinal cord and its nerves. · Avoid doing things that make your symptoms worse. Walking downhill and standing for a long time may cause pain. · Stretch and strengthen your back muscles as your doctor or physical therapist recommends. If your doctor says it is okay to do them, these exercises may help. ¨ Lie on your back with your knees bent. Gently pull one bent knee to your chest. Put that foot back on the floor, and then pull the other knee to your chest. 
¨ Do pelvic tilts. Lie on your back with your knees bent. Tighten your stomach muscles. Pull your belly button (navel) in and up toward your ribs. You should feel like your back is pressing to the floor and your hips and pelvis are slightly lifting off the floor. Hold for 6 seconds while breathing smoothly. ¨ Stand with your back flat against a wall. Slowly slide down until your knees are slightly bent. Hold for 10 seconds, then slide back up the wall. · Remove or change anything in your house that may cause you to fall. Keep walkways clear of clutter, electrical cords, and throw rugs. When should you call for help? Call 911 anytime you think you may need emergency care. For example, call if: 
· You are unable to move a leg at all. Call your doctor now or seek immediate medical care if: 
· You have new or worse symptoms in your legs, belly, or buttocks. Symptoms may include: ¨ Numbness or tingling. ¨ Weakness. ¨ Pain. · You lose bladder or bowel control. Watch closely for changes in your health, and be sure to contact your doctor if: 
· You are not getting better as expected. Where can you learn more? Go to http://brianSOLOMO Technologytabitha.info/. Cyndra Fleischer in the search box to learn more about \"Lumbar Stenosis: Care Instructions. \" Current as of: May 23, 2016 Content Version: 11.1 © 9657-5163 [facility]. Care instructions adapted under license by [employer] (which disclaims liability or warranty for this information). If you have questions about a medical condition or this instruction, always ask your healthcare professional. [address] any warranty or liability for your use of this information. Term discussed today: 
Serosanguineous Introducing [facility]! Natasha Veras introduces [employer] patient portal. Now you can access parts of your medical record, email your doctor's office, and request medication refills online. 1. In your internet browser, go to [email or web address] 2. Click on the First Time User? Click Here link in the Sign In box. You will see the New Member Sign Up page. 3. Enter your [employer] Access Code exactly as it appears below.  You will not need to use this code after youve completed the sign-up process. If you do not sign up before the expiration date, you must request a new code. · [employer] Access Code: [email or web address] Expires: 4/27/2017  9:00 AM 
 
4. Enter the last four digits of your Social Security Number (xxxx) and Date of Birth (mm/dd/yyyy) as indicated and click Submit. You will be taken to the next sign-up page. 5. Create a [employer] ID. This will be your [employer] login ID and cannot be changed, so think of one that is secure and easy to remember. 6. Create a [employer] password. You can change your password at any time. 7. Enter your Password Reset Question and Answer. This can be used at a later time if you forget your password. 8. Enter your e-mail address. You will receive e-mail notification when new information is available in [address] 9. Click Sign Up. You can now view and download portions of your medical record. 10. Click the Download Summary menu link to download a portable copy of your medical information. If you have questions, please visit the Frequently Asked Questions section of the [employer] website. Remember, [employer] is NOT to be used for urgent needs. For medical emergencies, dial 911. Now available from your iPhone and Android! Please provide this summary of care documentation to your next provider. Your primary care clinician is listed as Sheila Mercer. If you have any questions after today's visit, please call [phone].

## 2017-02-27 NOTE — PATIENT INSTRUCTIONS
Lumbar Stenosis: Care Instructions  Your Care Instructions  -  Stenosis in the spine is a narrowing of the canal that is around the spinal cord and nerve roots in your back. It can happen as part of aging. Sometimes bone and other tissue grow into this canal and press on the spinal nerves. This can cause pain, numbness, and weakness. When it happens in the lower part of your back, it is called lumbar stenosis. Lumbar stenosis can cause problems in the legs, feet, and rear end (buttocks). You may be able to relieve symptoms of lumbar stenosis with pain medicine. Your doctor may suggest physical therapy and exercises to keep your spine strong and flexible. Some people try cortisone shots to reduce swelling. If pain and numbness in your legs are still so bad that you cannot do your normal activities, you may need surgery. Follow-up care is a key part of your treatment and safety. Be sure to make and go to all appointments, and call your doctor if you are having problems. It's also a good idea to know your test results and keep a list of the medicines you take. How can you care for yourself at home? · Take an over-the-counter pain medicine, such as acetaminophen (Tylenol), ibuprofen (Advil, Motrin), or naproxen (Aleve). Read and follow all instructions on the label. · Do not take two or more pain medicines at the same time unless the doctor told you to. Many pain medicines have acetaminophen, which is Tylenol. Too much acetaminophen (Tylenol) can be harmful. · Stay at a healthy weight. Being overweight puts extra strain on your spine. · Change positions often when you sit or stand. This can ease pain. For example, lean forward. This may reduce pressure on the spinal cord and its nerves. · Avoid doing things that make your symptoms worse. Walking downhill and standing for a long time may cause pain. · Stretch and strengthen your back muscles as your doctor or physical therapist recommends.  If your doctor says it is okay to do them, these exercises may help. ¨ Lie on your back with your knees bent. Gently pull one bent knee to your chest. Put that foot back on the floor, and then pull the other knee to your chest.  ¨ Do pelvic tilts. Lie on your back with your knees bent. Tighten your stomach muscles. Pull your belly button (navel) in and up toward your ribs. You should feel like your back is pressing to the floor and your hips and pelvis are slightly lifting off the floor. Hold for 6 seconds while breathing smoothly. ¨ Stand with your back flat against a wall. Slowly slide down until your knees are slightly bent. Hold for 10 seconds, then slide back up the wall. · Remove or change anything in your house that may cause you to fall. Keep walkways clear of clutter, electrical cords, and throw rugs. When should you call for help? Call 911 anytime you think you may need emergency care. For example, call if:  · You are unable to move a leg at all. Call your doctor now or seek immediate medical care if:  · You have new or worse symptoms in your legs, belly, or buttocks. Symptoms may include:  ¨ Numbness or tingling. ¨ Weakness. ¨ Pain. · You lose bladder or bowel control. Watch closely for changes in your health, and be sure to contact your doctor if:  · You are not getting better as expected. Where can you learn more? Go to http://brian-tabitha.info/. Beverly Menchaca in the search box to learn more about \"Lumbar Stenosis: Care Instructions. \"  Current as of: May 23, 2016  Content Version: 11.1  © 7340-0591 [employer]. Care instructions adapted under license by [employer] (which disclaims liability or warranty for this information). If you have questions about a medical condition or this instruction, always ask your healthcare professional. [place] [address] any warranty or liability for your use of this information.             Term discussed today:  Serosanguineous

## 2017-03-07 ENCOUNTER — HOSPITAL ENCOUNTER (OUTPATIENT)
Dept: LAB | Age: 57
Discharge: HOME | End: 2017-03-07

## 2017-03-07 PROCEDURE — 99001 SPECIMEN HANDLING PT-LAB: CPT | Performed by: PHYSICIAN ASSISTANT

## 2017-05-02 ENCOUNTER — HOSPITAL ENCOUNTER (OUTPATIENT)
Dept: LAB | Age: 57
Discharge: HOME | End: 2017-05-02

## 2017-05-02 PROCEDURE — 99001 SPECIMEN HANDLING PT-LAB: CPT | Performed by: PHYSICIAN ASSISTANT

## 2017-05-11 ENCOUNTER — HOSPITAL ENCOUNTER (OUTPATIENT)
Dept: LAB | Age: 57
Discharge: HOME | End: 2017-05-11

## 2017-05-11 PROCEDURE — 99001 SPECIMEN HANDLING PT-LAB: CPT | Performed by: INTERNAL MEDICINE

## 2017-05-17 ENCOUNTER — HOSPITAL ENCOUNTER (OUTPATIENT)
Dept: LAB | Age: 57
Discharge: HOME | End: 2017-05-17

## 2017-05-17 PROCEDURE — 99001 SPECIMEN HANDLING PT-LAB: CPT | Performed by: INTERNAL MEDICINE

## 2018-06-18 ENCOUNTER — OFFICE VISIT (OUTPATIENT)
Dept: ORTHOPEDIC SURGERY | Age: 58
End: 2018-06-18

## 2018-06-18 VITALS
HEART RATE: 81 BPM | BODY MASS INDEX: 31.95 KG/M2 | WEIGHT: 249 LBS | OXYGEN SATURATION: 95 % | TEMPERATURE: 99.2 F | SYSTOLIC BLOOD PRESSURE: 153 MMHG | HEIGHT: 74 IN | DIASTOLIC BLOOD PRESSURE: 95 MMHG | RESPIRATION RATE: 16 BRPM

## 2018-06-18 DIAGNOSIS — S92.502A FRACTURE OF SECOND TOE, LEFT, CLOSED, INITIAL ENCOUNTER: ICD-10-CM

## 2018-06-18 DIAGNOSIS — M79.675 TOE PAIN, LEFT: Primary | ICD-10-CM

## 2018-06-18 DIAGNOSIS — S92.415A CLOSED NONDISPLACED FRACTURE OF PROXIMAL PHALANX OF LEFT GREAT TOE, INITIAL ENCOUNTER: ICD-10-CM

## 2018-06-18 NOTE — MR AVS SNAPSHOT
[address] [place] [address], [address] [facility] 
[phone] Patient: Carolina Mcgee MRN: [id number] Visit Information Date & Time Provider Department Dept. Phone Encounter #  
 6/18/2018  1:30 PM [facility] [phone] Follow-up Instructions Return in about 2 weeks (around 7/2/2018) for follow up evaluation. Upcoming Health Maintenance Date Due Hepatitis C Screening 1960 Pneumococcal 19-64 Highest Risk (1 of 3 - PCV13) 5/10/1978 DTaP/Tdap/Td series (1 - Tdap) 5/10/1980 FOBT Q 1 YEAR AGE 50-75 5/10/2009 MEDICARE YEARLY EXAM 3/14/2018 Influenza Age 5 to Adult 8/1/2018 Allergies as of 6/18/2018  Review Complete On: 6/18/2018 By: Adebayo Garcia PA-C Severity Noted Reaction Type Reactions Adhesive  01/12/2016    Itching Tears skin easily Current Immunizations  Never Reviewed Name Date Influenza Vaccine 10/1/2016 Not reviewed this visit You Were Diagnosed With   
  
 Codes Comments Toe pain, left    -  Primary ICD-10-CM: P35.110 ICD-9-CM: 729.5 Closed nondisplaced fracture of proximal phalanx of left great toe, initial encounter     ICD-10-CM: S98.168K ICD-9-CM: 826.0 Fracture of second toe, left, closed, initial encounter     ICD-10-CM: P91.465L ICD-9-CM: 826.0 Vitals BP Pulse Temp Resp Height(growth percentile) Weight(growth percentile) (!) 153/95 81 99.2 °F (37.3 °C) (Oral) 16 6' 2\" (1.88 m) 249 lb (112.9 kg) SpO2 BMI Smoking Status 95% 31.97 kg/m2 Never Smoker BMI and BSA Data Body Mass Index Body Surface Area  
 31.97 kg/m 2 2.43 m 2 Preferred Pharmacy Pharmacy Name Phone [facility] 1988, [address] [facility] [phone] Your Updated Medication List  
  
   
 This list is accurate as of 6/18/18  2:34 PM.  Always use your most recent med list.  
  
  
  
  
 allopurinol 300 mg tablet Commonly known as:  Merrily Schilder Take 300 mg by mouth daily. atorvastatin 10 mg tablet Commonly known as:  LIPITOR Take 10 mg by mouth two (2) times a day. Indications: HYPERCHOLESTEROLEMIA  
  
 CELLCEPT PO Take 750 mg by mouth two (2) times a day. clonazePAM 0.5 mg tablet Commonly known as:  Dala Pleasure Take 1 mg by mouth two (2) times a day. Indications: anxiety EFFEXOR  mg capsule Generic drug:  venlafaxine-SR Take 150 mg by mouth daily. Indications: ANXIETY WITH DEPRESSION  
  
 GENGRAF PO Take 100 mg by mouth two (2) times a day. lisinopril 10 mg tablet Commonly known as:  Therisa Semen Take 10 mg by mouth daily. Indications: HYPERTENSION  
  
 LYRICA 75 mg capsule Generic drug:  pregabalin Take 75 mg by mouth two (2) times a day. Indications: pain  
  
 methocarbamol 500 mg tablet Commonly known as:  ROBAXIN Take 1 Tab by mouth four (4) times daily. Omeprazole delayed release 20 mg tablet Commonly known as:  PRILOSEC D/R Take 20 mg by mouth daily. Indications: GASTROESOPHAGEAL REFLUX  
  
 oxyCODONE IR 30 mg immediate release tablet Commonly known as:  Santo Bers Take 1 Tab by mouth every six (6) hours. Max Daily Amount: 120 mg.  
  
 thiamine 100 mg tablet Commonly known as:  B-1 Take 100 mg by mouth daily. We Performed the Following AMB POC XRAY, FOOT; COMPLETE, 3+ VIEW [48372 CPT(R)] CLOSED TX BIG TOE FRACTURE [id number] CPT(R)] Follow-up Instructions Return in about 2 weeks (around 7/2/2018) for follow up evaluation. Patient Instructions Wear CAM boot Use buddy loop as instructed Minimal WB on left foot (heel only) Follow up in 2 weeks or sooner as needed Follow up with your PCP regarding elevated blood pressure Broken Toe: Care Instructions Your Care Instructions You have broken (fractured) a bone in your toe. This kind of fracture does not need a special cast or brace. \"Mirela-taping\" your broken toe to a healthy toe next to it is almost always enough to treat the problem and ease symptoms. The toe may take 4 weeks or more to heal. 
You heal best when you take good care of yourself. Eat a variety of healthy foods, and don't smoke. Follow-up care is a key part of your treatment and safety. Be sure to make and go to all appointments, and call your doctor if you are having problems. It's also a good idea to know your test results and keep a list of the medicines you take. How can you care for yourself at home? · Be safe with medicines. Take pain medicines exactly as directed. ¨ If the doctor gave you a prescription medicine for pain, take it as prescribed. ¨ If you are not taking a prescription pain medicine, ask your doctor if you can take an over-the-counter medicine. · If your toe is taped to the toe next to it, your doctor has shown you how to change the tape. Protect the skin by putting something soft, such as felt or foam, between your toes before you tape them together. Never tape the toes together skin-to-skin. Your broken toe may need to be mirela-taped for 2 to 4 weeks to heal. 
· Rest and protect your toe. Do not walk on it until you can do so without too much pain. If the doctor has told you to use crutches, use them as instructed. · Put ice or a cold pack on your toe for 10 to 20 minutes at a time. Try to do this every 1 to 2 hours for the next 3 days (when you are awake) or until the swelling goes down. Put a thin cloth between the ice and your skin. · Prop up your foot on a pillow when you ice it or anytime you sit or lie down. Try to keep it above the level of your heart. This will help reduce swelling. · Make sure you go to your follow-up appointments. Your doctor will need to check that your toe is healing right. When should you call for help? Call your doctor now or seek immediate medical care if: 
? · You have severe pain. ? · Your toe is cool or pale or changes color. ? · You have tingling, weakness, or numbness in your toe. ? Watch closely for changes in your health, and be sure to contact your doctor if: 
? · Pain and swelling get worse. ? · You are not getting better as expected. Where can you learn more? Go to http://brian-tabitha.info/. Enter W341 in the search box to learn more about \"Broken Toe: Care Instructions. \" Current as of: March 21, 2017 Content Version: 11.4 © 7534-4726 "Mobile Location, IP". Care instructions adapted under license by [employer] (which disclaims liability or warranty for this information). If you have questions about a medical condition or this instruction, always ask your healthcare professional. [address] any warranty or liability for your use of this information. Introducing [facility]! Shanti Parish introduces [employer] patient portal. Now you can access parts of your medical record, email your doctor's office, and request medication refills online. 1. In your internet browser, go to [email or web address] 2. Click on the First Time User? Click Here link in the Sign In box. You will see the New Member Sign Up page. 3. Enter your [employer] Access Code exactly as it appears below. You will not need to use this code after youve completed the sign-up process. If you do not sign up before the expiration date, you must request a new code. · [employer] Access Code: [email or web address] Expires: 9/16/2018  2:34 PM 
 
4. Enter the last four digits of your Social Security Number (xxxx) and Date of Birth (mm/dd/yyyy) as indicated and click Submit. You will be taken to the next sign-up page. 5. Create a [employer] ID.  This will be your [employer] login ID and cannot be changed, so think of one that is secure and easy to remember. 6. Create a ShedWorx password. You can change your password at any time. 7. Enter your Password Reset Question and Answer. This can be used at a later time if you forget your password. 8. Enter your e-mail address. You will receive e-mail notification when new information is available in [address] 9. Click Sign Up. You can now view and download portions of your medical record. 10. Click the Download Summary menu link to download a portable copy of your medical information. If you have questions, please visit the Frequently Asked Questions section of the [employer] website. Remember, [employer] is NOT to be used for urgent needs. For medical emergencies, dial 911. Now available from your iPhone and Android! Please provide this summary of care documentation to your next provider. Your primary care clinician is listed as Bill Nunes. If you have any questions after today's visit, please call [phone].

## 2018-06-18 NOTE — PROGRESS NOTES
Patient: Jc Dunn                MRN: [id number]       SSN: [id number]  YOB: 1960              AGE: 60 y.o. SEX: male    Justo Chery MD      Chief Complaint:   Chief Complaint   Patient presents with    Toe Pain     left big toe           HPI:     Jc Dunn is a 60 y.o. male who presents today for evaluation of left toe pain. Patient was referred by Dr. Jose Bosch office. Patient states that about 2.5 weeks ago he was wrestling with his grandson in a hotel room when he hit his left foot on the desk. He states that the toenail was damaged and he pulled it off. He did not seek immediate medical attention until his toe started to look worse. He states that he was then seen by Dr. Cathy Quigley who took x-rays, put him on ABX and referred him to us for a CAM boot. Patient has an allergy to adhesive tape, but he states he thinks it's just his arms that are affected. PHYSICAL EXAM:     Visit Vitals    BP (!) 153/95    Pulse 81    Temp 99.2 °F (37.3 °C) (Oral)    Resp 16    Ht 6' 2\" (1.88 m)    Wt 249 lb (112.9 kg)    SpO2 95%    BMI 31.97 kg/m2     Pain Scale: /10    GEN:  Alert, well developed, well nourished, well appearing 60 y.o. male in no acute distress. PSYCH:  Normal affect, mood, and conduct. alert, oriented x 3 alert, oriented x 3, no dementia  M/S EXAMINATION OF: left foot/ankle  SKIN: moderate edema, mild erythema, mild ecchymosis to great toe and #2 toe, no warmth. Toe nail missing from great toe  TENDERNESS:  mild tenderness to palpation   NEUROVASCULAR:  grossly intact. Positive distal pulses and capillary refill. DVT ASSESSMENT:  The calf is not tender to palpation. No evidence of DVT seen on physical exam.  ROM:  Not tested      IMPRESSION:     1. Toe pain, left    2. Closed nondisplaced fracture of proximal phalanx of left great toe, initial encounter    3.  Fracture of second toe, left, closed, initial encounter          PLAN:         Orders Placed This Encounter    Generic Supply Order    [82187] Foot Min 3V    CLOSED TX BIG TOE FRACTURE                 Wear CAM boot   Complete ABX prescribed by Dr. Anne Subramanian  Minimal WB on left foot (heel only)  Follow up in 2 weeks or sooner as needed - X-rays of left foot at [address]  Follow up with your PCP regarding elevated blood pressure              Patient understands treatment plan and has been provided with patient education. PAST MEDICAL HISTORY:     Past Medical History:   Diagnosis Date    Arrhythmia 2004    SVT    Arthritis     Asthma     hx of, as a child, no inhaler    Chronic pain     lower back pain    Cirrhosis (HCC)     Depression     Encephalitis     GERD (gastroesophageal reflux disease)     Gout     Hepatitis C     Hypertension     Left rotator cuff tear     Lumbar postlaminectomy syndrome     Nausea & vomiting     pt denies nausea and vomiting after surgery today    Osteoarthritis     Osteoarthritis of left knee 10/3/2016    Weakness        MEDICATIONS:     Current Outpatient Prescriptions   Medication Sig    oxyCODONE IR (OXY-IR) 30 mg immediate release tablet Take 1 Tab by mouth every six (6) hours. Max Daily Amount: 120 mg.    venlafaxine-SR (EFFEXOR XR) 150 mg capsule Take 150 mg by mouth daily. Indications: ANXIETY WITH DEPRESSION    pregabalin (LYRICA) 75 mg capsule Take 75 mg by mouth two (2) times a day. Indications: pain    atorvastatin (LIPITOR) 10 mg tablet Take 10 mg by mouth two (2) times a day. Indications: HYPERCHOLESTEROLEMIA    clonazePAM (KLONOPIN) 0.5 mg tablet Take 1 mg by mouth two (2) times a day. Indications: anxiety    CYCLOSPORINE, MODIFIED (GENGRAF PO) Take 100 mg by mouth two (2) times a day.  MYCOPHENOLATE MOFETIL (CELLCEPT PO) Take 750 mg by mouth two (2) times a day.  allopurinol (ZYLOPRIM) 300 mg tablet Take 300 mg by mouth daily.  thiamine (B-1) 100 mg tablet Take 100 mg by mouth daily.     lisinopril (PRINIVIL, ZESTRIL) 10 mg tablet Take 10 mg by mouth daily. Indications: HYPERTENSION    methocarbamol (ROBAXIN) 500 mg tablet Take 1 Tab by mouth four (4) times daily.  Omeprazole delayed release (PRILOSEC D/R) 20 mg tablet Take 20 mg by mouth daily. Indications: GASTROESOPHAGEAL REFLUX     No current facility-administered medications for this visit. ALLERGIES:     Allergies   Allergen Reactions    Adhesive Itching     Tears skin easily         PAST SURGICAL HISTORY:     Past Surgical History:   Procedure Laterality Date    ABLATION OF SVT  2004    CARDIAC SURG PROCEDURE UNLIST      CHEST SURGERY PROCEDURE UNLISTED      HX ACL RECONSTRUCTION      right    HX BACK SURGERY      4 times    HX LIVER TRANSPLANT  2015    HX LUMBAR FUSION      TRANSPLANT,PREP DONOR LIVER, WHOLE  February 2015       SOCIAL HISTORY:     Social History     Social History    Marital status:      Spouse name: N/A    Number of children: N/A    Years of education: N/A     Occupational History    Not on file. Social History Main Topics    Smoking status: Never Smoker    Smokeless tobacco: Never Used    Alcohol use No      Comment: drank too much years ago; none since    Drug use: No      Comment: years ago - 17's    Sexual activity: Yes     Other Topics Concern    Not on file     Social History Narrative       FAMILY HISTORY:     Family History   Problem Relation Age of Onset    Heart Attack Father      26    Heart Disease Father     Heart Disease Other     Cancer Brother          REVIEW OF SYSTEMS:     Otherwise as noted in HPI      RADIOGRAPHS & DIAGNOSTIC STUDIES     Results for orders placed or performed in visit on 06/18/18   AMB POC XRAY, FOOT; COMPLETE, 3+ VIEW    Narrative    Corry James PA-C     6/19/2018  8:42 AM  X-rays, 3 views of the left foot reveals fracture to great toe   proximal phalanx. Questionably irregularity noted to second toe. Diffuse degenerative changes noted.               Corry James PA-C  6/19/2018

## 2018-06-18 NOTE — PATIENT INSTRUCTIONS
Wear CAM boot   Use buddy loop as instructed  Minimal WB on left foot (heel only)  Follow up in 2 weeks or sooner as needed  Follow up with your PCP regarding elevated blood pressure         Broken Toe: Care Instructions  Your Care Instructions  You have broken (fractured) a bone in your toe. This kind of fracture does not need a special cast or brace. \"Buddy-taping\" your broken toe to a healthy toe next to it is almost always enough to treat the problem and ease symptoms. The toe may take 4 weeks or more to heal.  You heal best when you take good care of yourself. Eat a variety of healthy foods, and don't smoke. Follow-up care is a key part of your treatment and safety. Be sure to make and go to all appointments, and call your doctor if you are having problems. It's also a good idea to know your test results and keep a list of the medicines you take. How can you care for yourself at home? · Be safe with medicines. Take pain medicines exactly as directed. ¨ If the doctor gave you a prescription medicine for pain, take it as prescribed. ¨ If you are not taking a prescription pain medicine, ask your doctor if you can take an over-the-counter medicine. · If your toe is taped to the toe next to it, your doctor has shown you how to change the tape. Protect the skin by putting something soft, such as felt or foam, between your toes before you tape them together. Never tape the toes together skin-to-skin. Your broken toe may need to be buddy-taped for 2 to 4 weeks to heal.  · Rest and protect your toe. Do not walk on it until you can do so without too much pain. If the doctor has told you to use crutches, use them as instructed. · Put ice or a cold pack on your toe for 10 to 20 minutes at a time. Try to do this every 1 to 2 hours for the next 3 days (when you are awake) or until the swelling goes down. Put a thin cloth between the ice and your skin.   · Prop up your foot on a pillow when you ice it or anytime you sit or lie down. Try to keep it above the level of your heart. This will help reduce swelling. · Make sure you go to your follow-up appointments. Your doctor will need to check that your toe is healing right. When should you call for help? Call your doctor now or seek immediate medical care if:  ? · You have severe pain. ? · Your toe is cool or pale or changes color. ? · You have tingling, weakness, or numbness in your toe. ? Watch closely for changes in your health, and be sure to contact your doctor if:  ? · Pain and swelling get worse. ? · You are not getting better as expected. Where can you learn more? Go to http://brian-tabitha.info/. Enter S158 in the search box to learn more about \"Broken Toe: Care Instructions. \"  Current as of: March 21, 2017  Content Version: 11.4  © 0440-5017 [employer]. Care instructions adapted under license by [employer] (which disclaims liability or warranty for this information). If you have questions about a medical condition or this instruction, always ask your healthcare professional. [place] [address] any warranty or liability for your use of this information.

## 2018-06-18 NOTE — PROCEDURES
X-rays, 3 views of the left foot reveals fracture to great toe proximal phalanx. Questionably irregularity noted to second toe. Diffuse degenerative changes noted.

## 2018-07-02 ENCOUNTER — OFFICE VISIT (OUTPATIENT)
Dept: ORTHOPEDIC SURGERY | Age: 58
End: 2018-07-02

## 2018-07-02 VITALS
DIASTOLIC BLOOD PRESSURE: 96 MMHG | OXYGEN SATURATION: 98 % | SYSTOLIC BLOOD PRESSURE: 167 MMHG | RESPIRATION RATE: 16 BRPM | HEART RATE: 78 BPM | HEIGHT: 74 IN | WEIGHT: 247 LBS | TEMPERATURE: 97.5 F | BODY MASS INDEX: 31.7 KG/M2

## 2018-07-02 DIAGNOSIS — M79.672 LEFT FOOT PAIN: ICD-10-CM

## 2018-07-02 DIAGNOSIS — S92.402G CLOSED DISPLACED FRACTURE OF PHALANX OF LEFT GREAT TOE WITH DELAYED HEALING, UNSPECIFIED PHALANX, SUBSEQUENT ENCOUNTER: Primary | ICD-10-CM

## 2018-07-02 NOTE — MR AVS SNAPSHOT
[address] [place] [address], [address] [facility] 
[phone] Patient: Cristian Rodney MRN: [id number] PNP:[id number]/96/9202 Visit Information Date & Time Provider Department Dept. Phone Encounter #  
 7/2/2018  1:00  Jaison Jose, [address] [facility] [phone] Follow-up Instructions Return in about 3 weeks (around 7/23/2018) for follow up evaluation. Upcoming Health Maintenance Date Due Hepatitis C Screening 1960 Pneumococcal 19-64 Highest Risk (1 of 3 - PCV13) 5/10/1978 DTaP/Tdap/Td series (1 - Tdap) 5/10/1980 FOBT Q 1 YEAR AGE 50-75 5/10/2009 MEDICARE YEARLY EXAM 3/14/2018 Influenza Age 5 to Adult 8/1/2018 Allergies as of 7/2/2018  Review Complete On: 7/2/2018 By: Adebayo Garcia PA-C Severity Noted Reaction Type Reactions Adhesive  01/12/2016    Itching Tears skin easily Current Immunizations  Never Reviewed Name Date Influenza Vaccine 10/1/2016 Not reviewed this visit You Were Diagnosed With   
  
 Codes Comments Closed displaced fracture of phalanx of left great toe with delayed healing, unspecified phalanx, subsequent encounter    -  Primary ICD-10-CM: D00.981S ICD-9-CM: V54.19 Left foot pain     ICD-10-CM: D41.474 ICD-9-CM: 729.5 Vitals BP Pulse Temp Resp Height(growth percentile) Weight(growth percentile) (!) 167/96 78 97.5 °F (36.4 °C) (Oral) 16 6' 2\" (1.88 m) 247 lb (112 kg) SpO2 BMI Smoking Status 98% 31.71 kg/m2 Never Smoker BMI and BSA Data Body Mass Index Body Surface Area 31.71 kg/m 2 2.42 m 2 Preferred Pharmacy Pharmacy Name Phone [facility]  [facility] [address], [address] [phone] Your Updated Medication List  
  
   
This list is accurate as of 7/2/18  1:48 PM.  Always use your most recent med list.  
  
  
 allopurinol 300 mg tablet Commonly known as:  Frank Franco Take 300 mg by mouth daily. atorvastatin 10 mg tablet Commonly known as:  LIPITOR Take 10 mg by mouth two (2) times a day. Indications: HYPERCHOLESTEROLEMIA  
  
 CELLCEPT PO Take 750 mg by mouth two (2) times a day. clonazePAM 0.5 mg tablet Commonly known as:  Virgie Kava Take 1 mg by mouth two (2) times a day. Indications: anxiety EFFEXOR  mg capsule Generic drug:  venlafaxine-SR Take 150 mg by mouth daily. Indications: ANXIETY WITH DEPRESSION  
  
 GENGRAF PO Take 100 mg by mouth two (2) times a day. lisinopril 10 mg tablet Commonly known as:  Kvngflory [place] Take 10 mg by mouth daily. Indications: HYPERTENSION  
  
 LYRICA 75 mg capsule Generic drug:  pregabalin Take 75 mg by mouth two (2) times a day. Indications: pain  
  
 methocarbamol 500 mg tablet Commonly known as:  ROBAXIN Take 1 Tab by mouth four (4) times daily. Omeprazole delayed release 20 mg tablet Commonly known as:  PRILOSEC D/R Take 20 mg by mouth daily. Indications: GASTROESOPHAGEAL REFLUX  
  
 oxyCODONE IR 30 mg immediate release tablet Commonly known as:  Zelalem Tiffanie Take 1 Tab by mouth every six (6) hours. Max Daily Amount: 120 mg.  
  
 thiamine 100 mg tablet Commonly known as:  B-1 Take 100 mg by mouth daily. We Performed the Following AMB POC XRAY, FOOT; COMPLETE, 3+ VIEW [62921 CPT(R)] Follow-up Instructions Return in about 3 weeks (around 7/23/2018) for follow up evaluation. Patient Instructions Wear CAM boot Use buddy loop as instructed Minimal WB on left foot (heel only) Follow up in 3 weeks or sooner as needed Follow up with your PCP regarding elevated blood pressure Broken Toe: Care Instructions Your Care Instructions You have broken (fractured) a bone in your toe.  This kind of fracture does not need a special cast or brace. \"Mirela-taping\" your broken toe to a healthy toe next to it is almost always enough to treat the problem and ease symptoms. The toe may take 4 weeks or more to heal. 
You heal best when you take good care of yourself. Eat a variety of healthy foods, and don't smoke. Follow-up care is a key part of your treatment and safety. Be sure to make and go to all appointments, and call your doctor if you are having problems. It's also a good idea to know your test results and keep a list of the medicines you take. How can you care for yourself at home? · Be safe with medicines. Take pain medicines exactly as directed. ¨ If the doctor gave you a prescription medicine for pain, take it as prescribed. ¨ If you are not taking a prescription pain medicine, ask your doctor if you can take an over-the-counter medicine. · If your toe is taped to the toe next to it, your doctor has shown you how to change the tape. Protect the skin by putting something soft, such as felt or foam, between your toes before you tape them together. Never tape the toes together skin-to-skin. Your broken toe may need to be mirela-taped for 2 to 4 weeks to heal. 
· Rest and protect your toe. Do not walk on it until you can do so without too much pain. If the doctor has told you to use crutches, use them as instructed. · Put ice or a cold pack on your toe for 10 to 20 minutes at a time. Try to do this every 1 to 2 hours for the next 3 days (when you are awake) or until the swelling goes down. Put a thin cloth between the ice and your skin. · Prop up your foot on a pillow when you ice it or anytime you sit or lie down. Try to keep it above the level of your heart. This will help reduce swelling. · Make sure you go to your follow-up appointments. Your doctor will need to check that your toe is healing right. When should you call for help? Call your doctor now or seek immediate medical care if: ? · You have severe pain. ? · Your toe is cool or pale or changes color. ? · You have tingling, weakness, or numbness in your toe. ? Watch closely for changes in your health, and be sure to contact your doctor if: 
? · Pain and swelling get worse. ? · You are not getting better as expected. Where can you learn more? Go to http://brian-tabitha.info/. Enter Z353 in the search box to learn more about \"Broken Toe: Care Instructions. \" Current as of: March 21, 2017 Content Version: 11.4 © 9970-5762 [employer]. Care instructions adapted under license by [employer] (which disclaims liability or warranty for this information). If you have questions about a medical condition or this instruction, always ask your healthcare professional. [address] any warranty or liability for your use of this information. Introducing [facility]! Mikhail Hogan introduces [employer] patient portal. Now you can access parts of your medical record, email your doctor's office, and request medication refills online. 1. In your internet browser, go to [email or web address] [email or web address] 2. Click on the First Time User? Click Here link in the Sign In box. You will see the New Member Sign Up page. 3. Enter your [employer] Access Code exactly as it appears below. You will not need to use this code after youve completed the sign-up process. If you do not sign up before the expiration date, you must request a new code. · [employer] Access Code: [email or web address] Expires: 9/16/2018  2:34 PM 
 
4. Enter the last four digits of your Social Security Number (xxxx) and Date of Birth (mm/dd/yyyy) as indicated and click Submit. You will be taken to the next sign-up page. 5. Create a [employer] ID. This will be your [employer] login ID and cannot be changed, so think of one that is secure and easy to remember. 6. Create a [employer] password. You can change your password at any time. 7. Enter your Password Reset Question and Answer. This can be used at a later time if you forget your password. 8. Enter your e-mail address. You will receive e-mail notification when new information is available in [address] 9. Click Sign Up. You can now view and download portions of your medical record. 10. Click the Download Summary menu link to download a portable copy of your medical information. If you have questions, please visit the Frequently Asked Questions section of the [employer] website. Remember, [employer] is NOT to be used for urgent needs. For medical emergencies, dial 911. Now available from your iPhone and Android! Please provide this summary of care documentation to your next provider. Your primary care clinician is listed as Mercy Alaniz. If you have any questions after today's visit, please call [phone].

## 2018-07-02 NOTE — PATIENT INSTRUCTIONS
Wear CAM boot   Use buddy loop as instructed  Minimal WB on left foot (heel only)  Follow up in 3 weeks or sooner as needed  Follow up with your PCP regarding elevated blood pressure         Broken Toe: Care Instructions  Your Care Instructions  You have broken (fractured) a bone in your toe. This kind of fracture does not need a special cast or brace. \"Buddy-taping\" your broken toe to a healthy toe next to it is almost always enough to treat the problem and ease symptoms. The toe may take 4 weeks or more to heal.  You heal best when you take good care of yourself. Eat a variety of healthy foods, and don't smoke. Follow-up care is a key part of your treatment and safety. Be sure to make and go to all appointments, and call your doctor if you are having problems. It's also a good idea to know your test results and keep a list of the medicines you take. How can you care for yourself at home? · Be safe with medicines. Take pain medicines exactly as directed. ¨ If the doctor gave you a prescription medicine for pain, take it as prescribed. ¨ If you are not taking a prescription pain medicine, ask your doctor if you can take an over-the-counter medicine. · If your toe is taped to the toe next to it, your doctor has shown you how to change the tape. Protect the skin by putting something soft, such as felt or foam, between your toes before you tape them together. Never tape the toes together skin-to-skin. Your broken toe may need to be buddy-taped for 2 to 4 weeks to heal.  · Rest and protect your toe. Do not walk on it until you can do so without too much pain. If the doctor has told you to use crutches, use them as instructed. · Put ice or a cold pack on your toe for 10 to 20 minutes at a time. Try to do this every 1 to 2 hours for the next 3 days (when you are awake) or until the swelling goes down. Put a thin cloth between the ice and your skin.   · Prop up your foot on a pillow when you ice it or anytime you sit or lie down. Try to keep it above the level of your heart. This will help reduce swelling. · Make sure you go to your follow-up appointments. Your doctor will need to check that your toe is healing right. When should you call for help? Call your doctor now or seek immediate medical care if:  ? · You have severe pain. ? · Your toe is cool or pale or changes color. ? · You have tingling, weakness, or numbness in your toe. ? Watch closely for changes in your health, and be sure to contact your doctor if:  ? · Pain and swelling get worse. ? · You are not getting better as expected. Where can you learn more? Go to http://brian-tabitha.info/. Enter Q395 in the search box to learn more about \"Broken Toe: Care Instructions. \"  Current as of: March 21, 2017  Content Version: 11.4  © 3937-1168 [employer]. Care instructions adapted under license by [employer] (which disclaims liability or warranty for this information). If you have questions about a medical condition or this instruction, always ask your healthcare professional. [place] [address] any warranty or liability for your use of this information.

## 2018-07-02 NOTE — PROGRESS NOTES
Patient: Jim Michaels                MRN: [id number]       SSN: [id number]  YOB: 1960              AGE: 60 y.o. SEX: male    Barrington Cho MD      Chief Complaint:   Chief Complaint   Patient presents with    Toe Pain     left big toe           HPI:     Jim Michaels is a 60 y.o. male who presents today for follow up evaluation of a left toe fracture. Patient was last seen on 6/18 and was provided a CAM boot and instructed on PWB. Patient states that he has less pain when wearing the CAM boot. He presents today without the CAM boot wearing Dock shoes. Patient states that Dr. Chilo Samuel provided a refill rx for ABX. Patient reports a history of Liver Transplantation performed at [facility]. Patient states that he has a listed allergy to adhesive tape, but he states he thinks it's just his arms that are affected. He states that the tape used on his arms pulls at his skin and pulls the skin off. The patient was seen today, and I saw and evaluated him with Narciso Amezquita P.A.-C. The patient has a left great toe fracture. I re-explained to him that his diagnosis is a left great toe fracture to the lateral aspect of the left great toe. I reviewed the x-rays from his initial visit and the x-rays from today. I showed him this fracture, and I explained to him that this is a fracture that potentially could be unstable in terms of the fracture piece. Clinically, his alignment still looks good, and there is no varus instability. I had a lengthy discussion with him. Recommendation is for mirela taping of the number one and number two toe and continue use of his CAM walker boot. He arrives today without his CAM walker boot. He states that he has been comfortable wearing his deck shoes, but he does need to wear his boot to decrease the strain across the forefoot. So, I will see him at his next visit. He understands to be careful and to continue mirela taping.         PHYSICAL EXAM: Visit Vitals    BP (!) 167/96    Pulse 78    Temp 97.5 °F (36.4 °C) (Oral)    Resp 16    Ht 6' 2\" (1.88 m)    Wt 247 lb (112 kg)    SpO2 98%    BMI 31.71 kg/m2     Pain Scale: /10    GEN:  Alert, well developed, well nourished, well appearing 60 y.o. male in no acute distress. PSYCH:  Normal affect, mood, and conduct. alert, oriented x 3 alert, oriented x 3, no dementia  M/S EXAMINATION OF: left foot/ankle  SKIN: moderate edema,  mild ecchymosis to great toe, some congestion noted, no warmth. Toe nail missing from great toe. No indication of infection. Abrasions and discoloration indicative of venous stasis noted to LLE   TENDERNESS:  mild tenderness to palpation   NEUROVASCULAR:  grossly intact. Positive distal pulses and capillary refill. DVT ASSESSMENT:  The calf is not tender to palpation. No evidence of DVT seen on physical exam.  ROM:  Not tested      IMPRESSION:     1. Closed displaced fracture of phalanx of left great toe with delayed healing, unspecified phalanx, subsequent encounter    2. Left foot pain          PLAN:         Orders Placed This Encounter    [72451] Foot Min 3V             Patient understands treatment plan and has been provided with patient education. PAST MEDICAL HISTORY:     Past Medical History:   Diagnosis Date    Arrhythmia 2004    SVT    Arthritis     Asthma     hx of, as a child, no inhaler    Chronic pain     lower back pain    Cirrhosis (HCC)     Depression     Encephalitis     GERD (gastroesophageal reflux disease)     Gout     Hepatitis C     Hypertension     Left rotator cuff tear     Lumbar postlaminectomy syndrome     Nausea & vomiting     pt denies nausea and vomiting after surgery today    Osteoarthritis     Osteoarthritis of left knee 10/3/2016    Weakness        MEDICATIONS:     Current Outpatient Prescriptions   Medication Sig    oxyCODONE IR (OXY-IR) 30 mg immediate release tablet Take 1 Tab by mouth every six (6) hours.  Max Daily Amount: 120 mg.    venlafaxine-SR (EFFEXOR XR) 150 mg capsule Take 150 mg by mouth daily. Indications: ANXIETY WITH DEPRESSION    pregabalin (LYRICA) 75 mg capsule Take 75 mg by mouth two (2) times a day. Indications: pain    atorvastatin (LIPITOR) 10 mg tablet Take 10 mg by mouth two (2) times a day. Indications: HYPERCHOLESTEROLEMIA    clonazePAM (KLONOPIN) 0.5 mg tablet Take 1 mg by mouth two (2) times a day. Indications: anxiety    CYCLOSPORINE, MODIFIED (GENGRAF PO) Take 100 mg by mouth two (2) times a day.  MYCOPHENOLATE MOFETIL (CELLCEPT PO) Take 750 mg by mouth two (2) times a day.  Omeprazole delayed release (PRILOSEC D/R) 20 mg tablet Take 20 mg by mouth daily. Indications: GASTROESOPHAGEAL REFLUX    allopurinol (ZYLOPRIM) 300 mg tablet Take 300 mg by mouth daily.  thiamine (B-1) 100 mg tablet Take 100 mg by mouth daily.  lisinopril (PRINIVIL, ZESTRIL) 10 mg tablet Take 10 mg by mouth daily. Indications: HYPERTENSION    methocarbamol (ROBAXIN) 500 mg tablet Take 1 Tab by mouth four (4) times daily. No current facility-administered medications for this visit. ALLERGIES:     Allergies   Allergen Reactions    Adhesive Itching     Tears skin easily         PAST SURGICAL HISTORY:     Past Surgical History:   Procedure Laterality Date    ABLATION OF SVT  2004    CARDIAC SURG PROCEDURE UNLIST      CHEST SURGERY PROCEDURE UNLISTED      HX ACL RECONSTRUCTION      right    HX BACK SURGERY      4 times    HX LIVER TRANSPLANT  2015    HX LUMBAR FUSION      TRANSPLANT,PREP DONOR LIVER, WHOLE  February 2015       SOCIAL HISTORY:     Social History     Social History    Marital status:      Spouse name: N/A    Number of children: N/A    Years of education: N/A     Occupational History    Not on file.      Social History Main Topics    Smoking status: Never Smoker    Smokeless tobacco: Never Used    Alcohol use No      Comment: drank too much years ago; none since  Drug use: No      Comment: years ago - 17's    Sexual activity: Yes     Other Topics Concern    Not on file     Social History Narrative       FAMILY HISTORY:     Family History   Problem Relation Age of Onset    Heart Attack Father      26    Heart Disease Father     Heart Disease Other     Cancer Brother          REVIEW OF SYSTEMS:     Otherwise as noted in HPI      RADIOGRAPHS & DIAGNOSTIC STUDIES     Results for orders placed or performed in visit on 07/02/18   AMB POC XRAY, FOOT; COMPLETE, 3+ VIEW    Narrative    Ramya Quintero PA-C     7/2/2018  2:00 PM  X-rays, 3 views of the left foot reveals fracture to great toe   proximal phalanx. There is noted change in alignment on   comparison to previous X-rays from 6-18-18. Diffuse degenerative   changes noted. The patient has been examined and evaluated by Dr. Nicole Daniels during this visit and he agrees with the assessment and plan.             Ramya Quintero PA-C  7/2/2018

## 2018-07-02 NOTE — PROCEDURES
X-rays, 3 views of the left foot reveals fracture to great toe proximal phalanx. There is noted change in alignment on comparison to previous X-rays from 6-18-18. Diffuse degenerative changes noted.

## 2018-07-23 ENCOUNTER — OFFICE VISIT (OUTPATIENT)
Dept: ORTHOPEDIC SURGERY | Age: 58
End: 2018-07-23

## 2018-07-23 VITALS
DIASTOLIC BLOOD PRESSURE: 101 MMHG | HEIGHT: 74 IN | WEIGHT: 239 LBS | RESPIRATION RATE: 16 BRPM | SYSTOLIC BLOOD PRESSURE: 156 MMHG | TEMPERATURE: 98.4 F | OXYGEN SATURATION: 99 % | HEART RATE: 81 BPM | BODY MASS INDEX: 30.67 KG/M2

## 2018-07-23 DIAGNOSIS — M79.675 TOE PAIN, LEFT: ICD-10-CM

## 2018-07-23 DIAGNOSIS — S92.402G CLOSED DISPLACED FRACTURE OF PHALANX OF LEFT GREAT TOE WITH DELAYED HEALING, UNSPECIFIED PHALANX, SUBSEQUENT ENCOUNTER: Primary | ICD-10-CM

## 2018-07-23 DIAGNOSIS — M79.672 LEFT FOOT PAIN: ICD-10-CM

## 2018-07-23 NOTE — PROGRESS NOTES
Patient: Dougie Arias                MRN: [id number]       SSN: [id number] YOB: 1960              AGE: 60 y.o. SEX: male Delaney Adhikari MD 
 
 
Chief Complaint:  
Chief Complaint Patient presents with  Toe Pain L BIG TOE PAIN, F/U APPT. HPI:  
 
Dougie Arias is a 60 y.o. male who presents today for follow up evaluation of a left great toe fracture. Patient was last seen on 7/2. At his LOV, Dr. Austin Meredith had a lengthy discussion with him and recommended mirela loop of the number one and number two toe, wear CAM boot, minimal WB on left foot (heel only), follow up in 3 weeks and follow up with your PCP regarding elevated blood pressure. PHYSICAL EXAM:  
 
Visit Vitals  BP (!) 156/101  Pulse 81  Temp 98.4 °F (36.9 °C) (Oral)  Resp 16  
 Ht 6' 2\" (1.88 m)  Wt 239 lb (108.4 kg)  SpO2 99%  BMI 30.69 kg/m2 Pain Scale: /10 GEN:  Alert, well developed, well nourished, well appearing 60 y.o. male in no acute distress. PSYCH:  Normal affect, mood, and conduct. alert, oriented x 3 alert, oriented x 3, no dementia M/S EXAMINATION OF: left foot/ankle SKIN: moderate edema,  mild ecchymosis to great toe, some congestion noted, no warmth. Toe nail missing from great toe. No indication of infection. Abrasions and discoloration indicative of venous stasis noted to LLE TENDERNESS:  mild tenderness to palpation NEUROVASCULAR:  grossly intact. Positive distal pulses and capillary refill. DVT ASSESSMENT:  The calf is not tender to palpation. No evidence of DVT seen on physical exam. 
ROM:  Not tested IMPRESSION:  
 
1. Left foot pain 2. Closed displaced fracture of phalanx of left great toe with delayed healing, unspecified phalanx, subsequent encounter 3. Toe pain, left PLAN:  
 
 
 
Orders Placed This Encounter  [[id number]] Foot Min 3V  
  
  
 
 
Patient understands treatment plan and has been provided with patient education. PAST MEDICAL HISTORY:  
 
Past Medical History:  
Diagnosis Date  Arrhythmia 2004 SVT  Arthritis  Asthma   
 hx of, as a child, no inhaler  Chronic pain   
 lower back pain  Cirrhosis ([address]  Depression  Encephalitis  GERD (gastroesophageal reflux disease)  Gout  Hepatitis C   
 Hypertension  Left rotator cuff tear  Lumbar postlaminectomy syndrome  Nausea & vomiting   
 pt denies nausea and vomiting after surgery today  Osteoarthritis  Osteoarthritis of left knee 10/3/2016  Weakness MEDICATIONS:  
 
Current Outpatient Prescriptions Medication Sig  
 oxyCODONE IR (OXY-IR) 30 mg immediate release tablet Take 1 Tab by mouth every six (6) hours. Max Daily Amount: 120 mg.  
 venlafaxine-SR (EFFEXOR XR) 150 mg capsule Take 150 mg by mouth daily. Indications: ANXIETY WITH DEPRESSION  pregabalin (LYRICA) 75 mg capsule Take 75 mg by mouth two (2) times a day. Indications: pain  atorvastatin (LIPITOR) 10 mg tablet Take 10 mg by mouth two (2) times a day. Indications: HYPERCHOLESTEROLEMIA  clonazePAM (KLONOPIN) 0.5 mg tablet Take 1 mg by mouth two (2) times a day. Indications: anxiety  CYCLOSPORINE, MODIFIED (GENGRAF PO) Take 100 mg by mouth two (2) times a day.  MYCOPHENOLATE MOFETIL (CELLCEPT PO) Take 750 mg by mouth two (2) times a day.  Omeprazole delayed release (PRILOSEC D/R) 20 mg tablet Take 20 mg by mouth daily. Indications: GASTROESOPHAGEAL REFLUX  
 allopurinol (ZYLOPRIM) 300 mg tablet Take 300 mg by mouth daily.  thiamine (B-1) 100 mg tablet Take 100 mg by mouth daily.  lisinopril (PRINIVIL, ZESTRIL) 10 mg tablet Take 10 mg by mouth daily. Indications: HYPERTENSION  methocarbamol (ROBAXIN) 500 mg tablet Take 1 Tab by mouth four (4) times daily. No current facility-administered medications for this visit. ALLERGIES:  
 
Allergies Allergen Reactions  Adhesive Itching   Tears skin easily PAST SURGICAL HISTORY:  
 
Past Surgical History:  
Procedure Laterality Date  ABLATION OF SVT  2004  CARDIAC SURG PROCEDURE UNLIST  CHEST SURGERY PROCEDURE UNLISTED  HX ACL RECONSTRUCTION    
 right  HX BACK SURGERY    
 4 times  HX LIVER TRANSPLANT  2015  HX LUMBAR FUSION    
 TRANSPLANT,PREP DONOR LIVER, WHOLE  February 2015 SOCIAL HISTORY:  
 
Social History Social History  Marital status:  Spouse name: N/A  
 Number of children: N/A  
 Years of education: N/A Occupational History  Not on file. Social History Main Topics  Smoking status: Never Smoker  Smokeless tobacco: Never Used  Alcohol use No  
   Comment: drank too much years ago; none since  Drug use: No  
   Comment: years ago - 1980's  Sexual activity: Yes Other Topics Concern  Not on file Social History Narrative FAMILY HISTORY:  
 
Family History Problem Relation Age of Onset  Heart Attack Father   
  1971  
 Heart Disease Father  Heart Disease Other  Cancer Brother REVIEW OF SYSTEMS:  
 
Otherwise as noted in HPI 
 
 
RADIOGRAPHS & DIAGNOSTIC STUDIES No results found for any visits on 07/23/18. Kezia Rodgers PA-C 
7/23/2018

## 2018-07-23 NOTE — PROCEDURES
DIAGNOSTIC STUDIES:  X-rays, three views of the left foot, AP, lateral, and oblique, with attention to the great toe, shows a healing, left first great toe first proximal metatarsal fracture. There is still separation of the main piece, but there is some healing that is occurring. There is no subluxation or dislocation of this great toe at the MTP region.

## 2018-07-23 NOTE — PATIENT INSTRUCTIONS
Please follow up in 6 weeks. You are advised to contact us if your condition worsens. Foot Pain: Care Instructions Your Care Instructions Foot injuries that cause pain and swelling are fairly common. Almost all sports or home repair projects can cause a misstep that ends up as foot pain. Normal wear and tear, especially as you get older, also can cause foot pain. Most minor foot injuries will heal on their own, and home treatment is usually all you need to do. If you have a severe injury, you may need tests and treatment. Follow-up care is a key part of your treatment and safety. Be sure to make and go to all appointments, and call your doctor if you are having problems. It's also a good idea to know your test results and keep a list of the medicines you take. How can you care for yourself at home? · Take pain medicines exactly as directed. ¨ If the doctor gave you a prescription medicine for pain, take it as prescribed. ¨ If you are not taking a prescription pain medicine, ask your doctor if you can take an over-the-counter medicine. · Rest and protect your foot. Take a break from any activity that may cause pain. · Put ice or a cold pack on your foot for 10 to 20 minutes at a time. Put a thin cloth between the ice and your skin. · Prop up the sore foot on a pillow when you ice it or anytime you sit or lie down during the next 3 days. Try to keep it above the level of your heart. This will help reduce swelling. · Your doctor may recommend that you wrap your foot with an elastic bandage. Keep your foot wrapped for as long as your doctor advises. · If your doctor recommends crutches, use them as directed. · Wear roomy footwear. · As soon as pain and swelling end, begin gentle exercises of your foot. Your doctor can tell you which exercises will help. When should you call for help? Call 911 anytime you think you may need emergency care.  For example, call if: 
  · Your foot turns pale, white, blue, or cold.  
 Call your doctor now or seek immediate medical care if: 
  · You cannot move or stand on your foot.  
  · Your foot looks twisted or out of its normal position.  
  · Your foot is not stable when you step down.  
  · You have signs of infection, such as: 
¨ Increased pain, swelling, warmth, or redness. ¨ Red streaks leading from the sore area. ¨ Pus draining from a place on your foot. ¨ A fever.  
  · Your foot is numb or tingly.  
 Watch closely for changes in your health, and be sure to contact your doctor if: 
  · You do not get better as expected.  
  · You have bruises from an injury that last longer than 2 weeks. Where can you learn more? Go to http://brian-tabitha.info/. Enter R681 in the search box to learn more about \"Foot Pain: Care Instructions. \" Current as of: November 29, 2017 Content Version: 11.7 © 9548-5840 [employer]. Care instructions adapted under license by [employer] (which disclaims liability or warranty for this information). If you have questions about a medical condition or this instruction, always ask your healthcare professional. [place] [address] any warranty or liability for your use of this information.

## 2018-07-23 NOTE — PROGRESS NOTES
AMBULATORY PROGRESS NOTE      Patient: Alejandro Solano             MRN: [id number]     SSN: [id number] Body mass index is 30.69 kg/(m^2). YOB: 1960     AGE: 60 y.o. EX: male    PCP: Heather Levy MD    IMPRESSION/DIAGNOSIS AND TREATMENT PLAN     DIAGNOSES  1. Closed displaced fracture of phalanx of left great toe with delayed healing, unspecified phalanx, subsequent encounter    2. Left foot pain    3. Toe pain, left        Orders Placed This Encounter    [id number] understands his diagnoses and the proposed plan. Plan:    1) Wear comfortable shoes as directed. 2) Continue activity modification as directed. 3) Adrián Loops provided at office. RTO - 6 weeks / PLEASE OBTAIN X-RAYS OF: left foot 3 VIEWS    HPI AND EXAMINATION     Alejandro Solano IS A 60 y.o. male who presents to my outpatient office for follow up of a left great toe fracture. Since we saw him last, Mr. Roselyn Bee reports he has experienced less pain along his left great toe. Patient notes he is a bit concerned and wanted to get XR. XR imaging showed progressed healing of his left great toe fracture. He denies any recent falls, twists, or trauma. Patient will be evaluated in 6 weeks to get XR and further assess his toe fracture. GEN:  Alert, well developed, well nourished, well appearing 60 y.o. male in no acute distress. PSYCH:  Normal affect, mood, and conduct. alert, oriented x 3 alert, oriented x 3, no dementia  M/S EXAMINATION OF: left foot/ankle  SKIN: moderate edema,  mild ecchymosis to great toe, some congestion noted, no warmth. Toe nail missing from great toe. No indication of infection. Abrasions and discoloration indicative of venous stasis noted to LLE   TENDERNESS:  mild tenderness to palpation   NEUROVASCULAR:  grossly intact. Positive distal pulses and capillary refill. DVT ASSESSMENT:  The calf is not tender to palpation.  No evidence of DVT seen on physical exam.  ROM:  Not tested    CHART REVIEW     Past Medical History:   Diagnosis Date    Arrhythmia 2004    SVT    Arthritis     Asthma     hx of, as a child, no inhaler    Chronic pain     lower back pain    Cirrhosis (HCC)     Depression     Encephalitis     GERD (gastroesophageal reflux disease)     Gout     Hepatitis C     Hypertension     Left rotator cuff tear     Lumbar postlaminectomy syndrome     Nausea & vomiting     pt denies nausea and vomiting after surgery today    Osteoarthritis     Osteoarthritis of left knee 10/3/2016    Weakness      Current Outpatient Prescriptions   Medication Sig    oxyCODONE IR (OXY-IR) 30 mg immediate release tablet Take 1 Tab by mouth every six (6) hours. Max Daily Amount: 120 mg.    venlafaxine-SR (EFFEXOR XR) 150 mg capsule Take 150 mg by mouth daily. Indications: ANXIETY WITH DEPRESSION    pregabalin (LYRICA) 75 mg capsule Take 75 mg by mouth two (2) times a day. Indications: pain    atorvastatin (LIPITOR) 10 mg tablet Take 10 mg by mouth two (2) times a day. Indications: HYPERCHOLESTEROLEMIA    clonazePAM (KLONOPIN) 0.5 mg tablet Take 1 mg by mouth two (2) times a day. Indications: anxiety    CYCLOSPORINE, MODIFIED (GENGRAF PO) Take 100 mg by mouth two (2) times a day.  MYCOPHENOLATE MOFETIL (CELLCEPT PO) Take 750 mg by mouth two (2) times a day.  Omeprazole delayed release (PRILOSEC D/R) 20 mg tablet Take 20 mg by mouth daily. Indications: GASTROESOPHAGEAL REFLUX    allopurinol (ZYLOPRIM) 300 mg tablet Take 300 mg by mouth daily.  thiamine (B-1) 100 mg tablet Take 100 mg by mouth daily.  lisinopril (PRINIVIL, ZESTRIL) 10 mg tablet Take 10 mg by mouth daily. Indications: HYPERTENSION    methocarbamol (ROBAXIN) 500 mg tablet Take 1 Tab by mouth four (4) times daily. No current facility-administered medications for this visit.       Allergies   Allergen Reactions    Adhesive Itching     Tears skin easily     Past Surgical History:   Procedure Laterality Date    ABLATION OF SVT  2004    CARDIAC SURG PROCEDURE UNLIST      CHEST SURGERY PROCEDURE UNLISTED      HX ACL RECONSTRUCTION      right    HX BACK SURGERY      4 times    HX LIVER TRANSPLANT  2015    HX LUMBAR FUSION      TRANSPLANT,PREP DONOR LIVER, WHOLE  February 2015     Social History     Occupational History    Not on file. Social History Main Topics    Smoking status: Never Smoker    Smokeless tobacco: Never Used    Alcohol use No      Comment: drank too much years ago; none since    Drug use: No      Comment: years ago - 17's    Sexual activity: Yes     Family History   Problem Relation Age of Onset    Heart Attack Father      26    Heart Disease Father     Heart Disease Other     Cancer Brother        REVIEW OF SYSTEMS : 7/23/2018  ALL BELOW ARE Negative except : SEE HPI       Constitutional: Negative for fever, chills and weight loss. Neg Weigh Loss  Cardiovascular: Negative for chest pain, claudication and leg swelling. SOB, THOMPSON   Gastrointestinal: Negative for  pain, N/V/D/C, Blood in stool or urine,dysuria, hematuria,        Incontinence, pelvic pain  Musculoskeletal: see HPI. Neurological: Negative for dizziness and weakness. Negative for headaches,Visual Changes, Confusion, Seizures,   Psychiatric/Behavioral: Negative for depression, memory loss and substance abuse. Extremities:  Negative for  hair changes, rash or skin lesion changes. Hematologic: Negative for Bleeding problems, bruising, pallor or swollen lymph nodes. Peripheral Vascular: No calf pain, vascular vein tenderness to calf pain              No calf throbbing, posterior knee throbbing pain    DIAGNOSTIC IMAGING      Dictation on: 07/23/2018  3:14 PM by: José Luis Ulloa [[id number]]         Written by Alen Garcia, as dictated by Dr. Claudio Police. IDr. Greggory Police confirm that all documentation is accurate.

## 2018-09-11 ENCOUNTER — OFFICE VISIT (OUTPATIENT)
Dept: ORTHOPEDIC SURGERY | Age: 58
End: 2018-09-11

## 2018-09-11 VITALS
BODY MASS INDEX: 30.03 KG/M2 | HEART RATE: 112 BPM | SYSTOLIC BLOOD PRESSURE: 158 MMHG | DIASTOLIC BLOOD PRESSURE: 109 MMHG | TEMPERATURE: 97.9 F | WEIGHT: 234 LBS | OXYGEN SATURATION: 96 % | HEIGHT: 74 IN | RESPIRATION RATE: 16 BRPM

## 2018-09-11 DIAGNOSIS — M47.812 SPONDYLOSIS OF CERVICAL REGION WITHOUT MYELOPATHY OR RADICULOPATHY: ICD-10-CM

## 2018-09-11 DIAGNOSIS — M48.061 SPINAL STENOSIS OF LUMBAR REGION WITHOUT NEUROGENIC CLAUDICATION: ICD-10-CM

## 2018-09-11 DIAGNOSIS — M79.18 MYOFASCIAL PAIN: Primary | ICD-10-CM

## 2018-09-11 RX ORDER — OXYCODONE HYDROCHLORIDE 15 MG/1
15 TABLET ORAL
Qty: 28 TAB | Refills: 0 | Status: SHIPPED | OUTPATIENT
Start: 2018-09-11

## 2018-09-11 RX ORDER — LIDOCAINE HYDROCHLORIDE 20 MG/ML
4 INJECTION, SOLUTION EPIDURAL; INFILTRATION; INTRACAUDAL; PERINEURAL ONCE
Qty: 4 ML | Refills: 0
Start: 2018-09-11 | End: 2018-09-11

## 2018-09-11 RX ORDER — BETAMETHASONE SODIUM PHOSPHATE AND BETAMETHASONE ACETATE 3; 3 MG/ML; MG/ML
12 INJECTION, SUSPENSION INTRA-ARTICULAR; INTRALESIONAL; INTRAMUSCULAR; SOFT TISSUE ONCE
Qty: 2 ML | Refills: 0
Start: 2018-09-11 | End: 2018-09-11

## 2018-09-11 RX ORDER — OXYCODONE HYDROCHLORIDE 30 MG/1
30 TABLET ORAL
Qty: 21 TAB | Refills: 0 | Status: CANCELLED | OUTPATIENT
Start: 2018-09-11

## 2018-09-11 RX ORDER — BUPIVACAINE HYDROCHLORIDE 2.5 MG/ML
4 INJECTION, SOLUTION INFILTRATION; PERINEURAL ONCE
Qty: 4 ML | Refills: 0
Start: 2018-09-11 | End: 2018-09-11

## 2018-09-11 NOTE — PROGRESS NOTES
[address] 
[address], [address] Phone: [phone] Fax: [phone] Lubna Fajardo : 1960 PCP: Brian Campoverde MD 
2018 PROGRESS NOTE ASSESSMENT AND PLAN Estela Keller comes in to the office today for PRN f/u. He c/o upper back pain and lower back pain \"right above the screws. \" He states his low back pain worsens with sitting and his upper back pain increases with movement of his neck or shoulders. He is no longer in pain management. He rates his pain as an 8/10 today. On examination, he had tenderness to palpation of his levator scapulae on the R and thoracic and lumbar paraspinals. His pain is likely myofascial in nature. We discussed the options of PT vs trigger point injections. He defers the option of PT at this time. I provided trigger point injections in office today into his cervical paraspinals that improved his pain by 50%. I also provided a one time acute course of Oxycodone 15mg QID PRN to help bridge him to pain management. Pt will f/u PRN. Diagnoses and all orders for this visit: 
 
1. Myofascial pain 
-     bupivacaine (MARCAINE) 0.25 % (2.5 mg/mL) soln injection; 4 mL by IntraMUSCular route once for 1 dose. -     INJECTION, BUPIVICAINE HYDRO 
-     LIDOCAINE INJECTION 
-     lidocaine, PF, (XYLOCAINE) 20 mg/mL (2 %) injection; 4 mL by Other route once for 1 dose. -     betamethasone (CELESTONE SOLUSPAN) 6 mg/mL injection; 2 mL by IntraMUSCular route once for 1 dose. 
-     BETAMETHASONE ACETATE & SODIUM PHOSPHATE INJECTION 6 MG 
-     48147 - INJECT TRIGGER POINTS, > 3 
-     oxyCODONE IR (OXY-IR) 15 mg immediate release tablet; Take 1 Tab by mouth four (4) times daily as needed for Pain. Max Daily Amount: 60 mg. 
 
2. Spinal stenosis of lumbar region without neurogenic claudication 
-     oxyCODONE IR (OXY-IR) 15 mg immediate release tablet;  Take 1 Tab by mouth four (4) times daily as needed for Pain. Max Daily Amount: 60 mg. 
 
3. Spondylosis of cervical region without myelopathy or radiculopathy 
-     oxyCODONE IR (OXY-IR) 15 mg immediate release tablet; Take 1 Tab by mouth four (4) times daily as needed for Pain. Max Daily Amount: 60 mg. Follow-up Disposition: Not on File HISTORY OF PRESENT ILLNESS Aria Harrison is a 60 y.o. male. A&P / HPI from 1/19/2017: 
Aria Harrison comes in to the office today for a prn f/u. He comes in today because he had a sudden flare up of lumbar pain 4 days ago that wraps around to his flanks bilaterally. The area is tender to palpation and he has pain with back flexion. This pain may be due to his moderately severe lumbar stenosis at L2-3.  
   
He was referred for an L1-2 interlaminar injection to see if it will provide relief. We discussed a referral to PT as another option but pt declined. Pt was also prescribed Robaxin 500 mg QID per his request. 
   
Pt will f/u in 3 weeks or prn. 
  
Updates from 02/27/17: 
Pt presents for an L1-2 interlaminar injection f/u. He found great relief from the injection and is experiencing decreased pain which is much more manageable compared to his last visit.  
  
He has taken Robaxin 500 mg QID without relief. Updates from 09/11/18: 
Pt presents for PRN f/u. He reports he has lost 30lbs since his last OV. He states he switched to Dr. Oleg Kenyon (from Dr. Chaim Tom) for pain management, but they did not get along well, so he has not been since. He states he left pain management (or was kicked out) because he had alcohol in his UDS and he is on the liver transplant list. 
 
Today he c/o neck and upper back pain along with his chronic low back pain \"right above the screws. \" He states he is concerned about his elevated blood pressure. He states his blood pressure is elevated due to pain. PAST MEDICAL HISTORY Past Medical History:  
Diagnosis Date  Arrhythmia 2004 SVT  
 Arthritis  Asthma   
 hx of, as a child, no inhaler  Chronic pain   
 lower back pain  Cirrhosis ([address]  Depression  Encephalitis  GERD (gastroesophageal reflux disease)  Gout  Hepatitis C   
 Hypertension  Left rotator cuff tear  Lumbar postlaminectomy syndrome  Nausea & vomiting   
 pt denies nausea and vomiting after surgery today  Osteoarthritis  Osteoarthritis of left knee 10/3/2016  Weakness Past Surgical History:  
Procedure Laterality Date  ABLATION OF SVT  2004  CARDIAC SURG PROCEDURE UNLIST  CHEST SURGERY PROCEDURE UNLISTED  HX ACL RECONSTRUCTION    
 right  HX BACK SURGERY    
 4 times  HX LIVER TRANSPLANT  2015  HX LUMBAR FUSION    
 TRANSPLANT,PREP DONOR LIVER, WHOLE  February 2015 Josh Reasoner MEDICATIONS Current Outpatient Prescriptions Medication Sig Dispense Refill  methocarbamol (ROBAXIN) 500 mg tablet Take 1 Tab by mouth four (4) times daily. 120 Tab 2  
 oxyCODONE IR (OXY-IR) 30 mg immediate release tablet Take 1 Tab by mouth every six (6) hours. Max Daily Amount: 120 mg. 60 Tab 0  
 venlafaxine-SR (EFFEXOR XR) 150 mg capsule Take 150 mg by mouth daily. Indications: ANXIETY WITH DEPRESSION  pregabalin (LYRICA) 75 mg capsule Take 75 mg by mouth two (2) times a day. Indications: pain  atorvastatin (LIPITOR) 10 mg tablet Take 10 mg by mouth two (2) times a day. Indications: HYPERCHOLESTEROLEMIA  clonazePAM (KLONOPIN) 0.5 mg tablet Take 1 mg by mouth two (2) times a day. Indications: anxiety  CYCLOSPORINE, MODIFIED (GENGRAF PO) Take 100 mg by mouth two (2) times a day.  MYCOPHENOLATE MOFETIL (CELLCEPT PO) Take 750 mg by mouth two (2) times a day.  Omeprazole delayed release (PRILOSEC D/R) 20 mg tablet Take 20 mg by mouth daily. Indications: GASTROESOPHAGEAL REFLUX    
 allopurinol (ZYLOPRIM) 300 mg tablet Take 300 mg by mouth daily.  thiamine (B-1) 100 mg tablet Take 100 mg by mouth daily.  lisinopril (PRINIVIL, ZESTRIL) 10 mg tablet Take 10 mg by mouth daily. Indications: HYPERTENSION    
  
 
ALLERGIES Allergies Allergen Reactions  Adhesive Itching Tears skin easily SOCIAL HISTORY Social History Social History  Marital status:  Spouse name: N/A  
 Number of children: N/A  
 Years of education: N/A Social History Main Topics  Smoking status: Never Smoker  Smokeless tobacco: Never Used  Alcohol use No  
   Comment: drank too much years ago; none since  Drug use: No  
   Comment: years ago - 1980's  Sexual activity: Yes Other Topics Concern  Not on file Social History Narrative FAMILY HISTORY Family History Problem Relation Age of Onset  Heart Attack Father   
  1971  
 Heart Disease Father  Heart Disease Other  Cancer Brother REVIEW OF SYSTEMS Review of Systems Musculoskeletal: Positive for back pain, myalgias and neck pain. PHYSICAL EXAMINATION There were no vitals taken for this visit. Pain Assessment  7/23/2018 Location of Pain Toe Pain Location Comment - Location Modifiers Left Severity of Pain 5 Quality of Pain Throbbing Quality of Pain Comment NUMBNESS Duration of Pain A few hours Frequency of Pain Intermittent Aggravating Factors Walking;Standing Aggravating Factors Comment PRESSURE & WEIGHT Limiting Behavior Yes Relieving Factors Elevation Relieving Factors Comment - Result of Injury Yes Work-Related Injury No  
Type of Injury Other (Comment) Type of Injury Comment HIT DESK IN HOTEL ROOM Constitutional:  Well developed, well nourished, in no acute distress. Psychiatric: Affect and mood are appropriate. Integumentary: No rashes or abrasions noted on exposed areas. SPINE/MUSCULOSKELETAL EXAM 
 
Lumbar spine: No rash, ecchymosis, or gross obliquity.   
 No fasciculations.   
No focal atrophy is noted.   
No pain with hip ROM.   
Range of motion is normal.  
No tenderness to palpation at the sciatic notch.   
SI joints non-tender.   
Trochanters non tender.   
   
Sensation in the bilateral legs grossly intact to light touch. 
   
Updates from 01/19/17: 
Pain with back flexion. Diffuse tenderness to palpation in the lumbar region. MOTOR:   
  Biceps  Triceps Deltoids Wrist Ext Wrist Flex Hand Intrin Right 5/5 5/5 5/5 5/5 5/5 5/5 Left 5/5 5/5 5/5 5/5 5/5 5/5 Hip Flex  Quads Hamstrings Ankle DF EHL Ankle PF Right 5/5 5/5 5/5 5/5 5/5 5/5 Left 5/5 5/5 5/5 5/5 5/5 5/5 DTRs are 2+ biceps, triceps, brachioradialis, patella, and Achilles. 
   
Negative Straight Leg raise.   
Squat not tested.   
No difficulty with tandem gait.   
   
Ambulation with single point cane. FWB.    
RADIOGRAPH Cervical MRI images taken on 8/25/2016 personally reviewed with patient: The bone marrow signal pattern is unremarkable. The cervical spine alignment is 
unremarkable. 
   
The visualized portions of the brain are unremarkable. The spinal cord signal 
is normal. No abnormal intrathecal or epidural lesion is detected. 
   
C2-3: Bulging disc-osteophyte along the right posterolateral aspect and to 
lesser extent along the left posterior lateral aspect. No central canal 
stenosis or foraminal stenosis.    
C3-4: Mild bulging disc-osteophyte along posterior lateral aspect. Mild 
posterior disc bulge. The AP diameter of the central canal is estimated at 0.9 
cm. Mild facet hypertrophy. Minimal borderline left foraminal narrowing. 
   
C4-5: Moderate disc bulge. Mild bulging disc-osteophyte along the left 
posterior lateral aspect. No significant foraminal narrowing.  The AP diameter 
of the central canal is estimated at 0.9 cm. 
   
C5-6: Broad-based protruding disc-osteophyte along the left posterolateral 
 aspect. Mild bulging disc-osteophyte along the right posterior lateral aspect.   
Moderate to pronounced left foraminal narrowing. Mild right foraminal 
narrowing. Mild disc bulge. There is a AP diameter of the central canal is 
estimated at 0.9 cm. 
   
C6-7: Broad-based disc bulge. Most pronounced along the right paracentral to 
posterolateral aspect. Mild bulging disc-osteophyte along the left 
posterolateral aspect. No significant foraminal narrowing on the left side.   
Minimal narrowing on the right. The AP diameter of the central canal is 
estimated at 0.9 cm. 
   
C7-T1: Mild bilateral facet hypertrophy. No central canal stenosis or 
foraminal stenosis. 
   
IMPRESSION IMPRESSION: 
   
1. Long segment central canal narrowing. 2. Foraminal narrowing at multiple levels of the cervical spine. 3. Degenerative changes of the cervical spine with disc-osteophyte bulge and 
disc-osteophyte protrusion. Most pronounced at C5-6 on the left side. 
   
Thoracic MRI images taken on 8/25/2016 personally reviewed with patient: 
No compression deformity is observed at T5. Subtle endplate invagination is 
observed at T4 and to lesser extent at T3. These findings appear stable dating 
back to at least 2012. 
   
The previously observed T11 superior aspect marrow edema has since resolved. No 
convincing evidence for significant compression deformity is detected. There is 
endplate invagination similar to the previous study. Decreased disc height is 
observed at T10-11. 
   
No acute subluxation is detected. No convincing evidence for acute fracture is 
identified. No suspicious vertebral marrow edema is appreciated. 
   
No abnormal intrathecal contents are noted.   
   
At T2-3, a focal bulging disc-osteophyte is suggested along the right posterior 
lateral aspect. Smaller subtle disc-osteophyte bulge is also suggested at T3-4 
along the right posterior lateral aspect as well. Minimal disc bulge is suggested at T10-11. No evidence for significant central canal stenosis or 
foraminal stenosis is detected throughout the thoracic spine. 
   
IMPRESSION IMPRESSION: 
   
1. Stable unchanged compression deformity at T5. Subtle endplate invagination 
deformities, also unchanged.   
2. Interval resolution of the T10 superior endplate and adjacent marrow edema. 3. Disc-osteophyte bulge without central canal or foraminal narrowing. 
   
Lumbar MRI images taken on 8/25/2016 personally reviewed with patient: 
Lumbar fusion with pedicle screws through L4, L5, and lumbarized S1. Incorporated bone plugs at the disc spaces. There is trace retrolisthesis of L3. Large Schmorl nodes in inferior L3 and L2 endplates with marrow edema. No 
compression fracture. 
   
Conus shows a cystic lesion, with no enhancement measuring approximately 13 x 6 
mm, benign cyst. Mild arachnoiditis is suspected at the descending nerve roots 
in the thecal sac. 
   
T12-L1: No disc herniation, central or foraminal stenosis. 
   
L1-L2: Posterior lateral corner disc protrusion, more prominent on the left, 
with mild effacement of left lateral recess. Moderate left foraminal narrowing 
with no definite exiting nerve root compression. Patent right foramen. 
   
L2-L3: Posterior disc bulge, flattening anterior thecal sac. Facet and 
ligamentous hypertrophy. Posterior epidural fat present. AP canal measures 5 mm, 
moderately severe central stenosis. No foraminal stenosis or exiting nerve root 
compression. Small bilateral facet joint effusion. 
   
L3-L4: Posterior disc bulge. Facet and ligamentous hypertrophy. AP canal 
measures 8 mm, mild to moderate central stenosis. Moderate bilateral foraminal 
narrowing with no definite exiting nerve root compression. 
   
L4-L5: No disc material. No central stenosis. Mild foraminal narrowing with no 
definite exiting nerve root compression.  
   
 L5-S1: No disc material. Posterior endplate spondylosis. No central stenosis. Mild foraminal narrowing with no exiting nerve root compression. There is 
suggestion of conjoined left S1 and S2 nerve roots. 
   
IMPRESSION IMPRESSION: Lumbosacral fusion. Disc disease with central stenosis most severe 
at L2-L3. Arachnoiditis. Additional findings as above. [facility] 
OFFICE PROCEDURE PROGRESS NOTE PROCEDURE: In the office today after informed consent using aseptic technique, the patient was injected with a total of 2 cc of Celestone, 4 cc each of Lidocaine and Marcaine into his cervical paraspinals. Chart reviewed for the following: 
 I, Dr. John Mcconnell, have reviewed the History, Physical and updated the Allergic reactions for Johann Copeland TIME OUT performed immediately prior to start of procedure: 
 I, Dr. John Mcconnell, have performed the following reviews on Johann Copeland prior to the start of the procedure: 
         
* Patient was identified by name and date of birth * Agreement on procedure being performed was verified * Risks and Benefits explained to the patient * Procedure site verified and marked as necessary * Patient was positioned for comfort * Consent was signed and verified Time: 4:30pm 
 
Date of procedure: 9/11/2018 Procedure performed by:  Michelle Gaming MD 
 
Provider assisted by: None Patient assisted by: Self How tolerated by patient: Pt tolerated the procedure well with no complications. 35 minutes of face-to-face contact were spent with the patient during today's visit extensively discussing symptoms and treatment plan. All questions were answered. More than half of this visit today was spent on counseling.   
 
Written by Vernelle Soulier as dictated by John Mcconnell MD

## 2018-09-11 NOTE — MR AVS SNAPSHOT
[address] [address] [address] [facility] [address] 
[phone] Patient: Pee Mora MRN: [id number] UDV:[id number] Visit Information Date & Time Provider Department Dept. Phone Encounter #  
 9/11/2018  2:30 PM Minor Coulter MD [facility] [phone] [id number] Follow-up Instructions Return if symptoms worsen or fail to improve. Routing History Follow-up and Disposition History Upcoming Health Maintenance Date Due Hepatitis C Screening 1960 Pneumococcal 19-64 Highest Risk (1 of 3 - PCV13) 5/10/1978 DTaP/Tdap/Td series (1 - Tdap) 5/10/1980 FOBT Q 1 YEAR AGE 50-75 5/10/2009 MEDICARE YEARLY EXAM 3/14/2018 Influenza Age 5 to Adult 8/1/2018 Allergies as of 9/11/2018  Review Complete On: 9/11/2018 By: Minor Coulter MD  
  
 Severity Noted Reaction Type Reactions Adhesive  01/12/2016    Itching Tears skin easily Current Immunizations  Never Reviewed Name Date Influenza Vaccine 10/1/2016 Not reviewed this visit You Were Diagnosed With   
  
 Codes Comments Myofascial pain    -  Primary ICD-10-CM: M79.1 ICD-9-CM: 729.1 Spinal stenosis of lumbar region without neurogenic claudication     ICD-10-CM: M48.061 
ICD-9-CM: 724.02 Spondylosis of cervical region without myelopathy or radiculopathy     ICD-10-CM: M47.812 ICD-9-CM: 721.0 Vitals BP Pulse Temp Resp Height(growth percentile) Weight(growth percentile) (!) 158/109 (BP 1 Location: Left arm, BP Patient Position: Sitting) (!) 112 97.9 °F (36.6 °C) (Oral) 16 6' 2\" (1.88 m) 234 lb (106.1 kg) SpO2 BMI Smoking Status 96% 30.04 kg/m2 Never Smoker BMI and BSA Data Body Mass Index Body Surface Area 30.04 kg/m 2 2.35 m 2 Preferred Pharmacy Pharmacy Name Phone [facility] [address] [facility] [address] [facility] [phone] Your Updated Medication List  
  
   
This list is accurate as of 9/11/18  5:10 PM.  Always use your most recent med list.  
  
  
  
  
 allopurinol 300 mg tablet Commonly known as:  Orpha Conquest Take 300 mg by mouth daily. atorvastatin 10 mg tablet Commonly known as:  LIPITOR Take 10 mg by mouth two (2) times a day. Indications: HYPERCHOLESTEROLEMIA  
  
 betamethasone 6 mg/mL injection Commonly known as:  CELESTONE SOLUSPAN  
2 mL by IntraMUSCular route once for 1 dose. bupivacaine 0.25 % (2.5 mg/mL) Soln injection Commonly known as:  MARCAINE  
4 mL by IntraMUSCular route once for 1 dose. CELLCEPT PO Take 750 mg by mouth two (2) times a day. clonazePAM 0.5 mg tablet Commonly known as:  Ransom Cade Take 1 mg by mouth two (2) times a day. Indications: anxiety EFFEXOR  mg capsule Generic drug:  venlafaxine-SR Take 150 mg by mouth daily. Indications: ANXIETY WITH DEPRESSION  
  
 GENGRAF PO Take 100 mg by mouth two (2) times a day. lidocaine (PF) 20 mg/mL (2 %) injection Commonly known as:  XYLOCAINE  
4 mL by Other route once for 1 dose. lisinopril 10 mg tablet Commonly known as:  Abbi Needy Take 10 mg by mouth daily. Indications: HYPERTENSION  
  
 LYRICA 75 mg capsule Generic drug:  pregabalin Take 75 mg by mouth two (2) times a day. Indications: pain  
  
 methocarbamol 500 mg tablet Commonly known as:  ROBAXIN Take 1 Tab by mouth four (4) times daily. Omeprazole delayed release 20 mg tablet Commonly known as:  PRILOSEC D/R Take 20 mg by mouth daily. Indications: GASTROESOPHAGEAL REFLUX  
  
 * oxyCODONE IR 30 mg immediate release tablet Commonly known as:  Sabas Ye Take 1 Tab by mouth every six (6) hours. Max Daily Amount: 120 mg.  
  
 * oxyCODONE IR 15 mg immediate release tablet Commonly known as:  OXY-IR  
 Take 1 Tab by mouth four (4) times daily as needed for Pain. Max Daily Amount: 60 mg.  
  
 thiamine  mg tablet Commonly known as:  B-1 Take 100 mg by mouth daily. * Notice: This list has 2 medication(s) that are the same as other medications prescribed for you. Read the directions carefully, and ask your doctor or other care provider to review them with you. Prescriptions Printed Refills  
 oxyCODONE IR (OXY-IR) 15 mg immediate release tablet 0 Sig: Take 1 Tab by mouth four (4) times daily as needed for Pain. Max Daily Amount: 60 mg.  
 Class: Print Route: Oral  
  
We Performed the Following BETAMETHASONE ACETATE & SODIUM PHOSPHATE INJECTION 3 MG EA. [ [facility]] INJECTION, BUPIVICAINE HYDRO [ [facility]] LIDOCAINE INJECTION [ [facility]] [place] INJECT TRIGGER POINTS, > 3 [id number] CPT(R)] Follow-up Instructions Return if symptoms worsen or fail to improve. Introducing [facility]! Joya Honeycutt introduces [employer] patient portal. Now you can access parts of your medical record, email your doctor's office, and request medication refills online. 1. In your internet browser, go to [email or web address] 2. Click on the First Time User? Click Here link in the Sign In box. You will see the New Member Sign Up page. 3. Enter your [employer] Access Code exactly as it appears below. You will not need to use this code after youve completed the sign-up process. If you do not sign up before the expiration date, you must request a new code. · [employer] Access Code: [email or web address] Expires: 9/16/2018  2:34 PM 
 
4. Enter the last four digits of your Social Security Number (xxxx) and Date of Birth (mm/dd/yyyy) as indicated and click Submit. You will be taken to the next sign-up page. 5. Create a [employer] ID. This will be your [employer] login ID and cannot be changed, so think of one that is secure and easy to remember. 6. Create a [employer]ran[employer] password. You can change your password at any time. 7. Enter your Password Reset Question and Answer. This can be used at a later time if you forget your password. 8. Enter your e-mail address. You will receive e-mail notification when new information is available in [address] 9. Click Sign Up. You can now view and download portions of your medical record. 10. Click the Download Summary menu link to download a portable copy of your medical information. If you have questions, please visit the Frequently Asked Questions section of the [employer] website. Remember, [employer] is NOT to be used for urgent needs. For medical emergencies, dial 911. Now available from your iPhone and Android! Please provide this summary of care documentation to your next provider. Your primary care clinician is listed as Nissa Triana. If you have any questions after today's visit, please call [phone].

## 2018-11-01 ENCOUNTER — OFFICE VISIT (OUTPATIENT)
Dept: ORTHOPEDIC SURGERY | Age: 58
End: 2018-11-01

## 2018-11-01 VITALS
BODY MASS INDEX: 29.93 KG/M2 | TEMPERATURE: 97.3 F | HEART RATE: 99 BPM | SYSTOLIC BLOOD PRESSURE: 126 MMHG | HEIGHT: 74 IN | OXYGEN SATURATION: 98 % | DIASTOLIC BLOOD PRESSURE: 83 MMHG | RESPIRATION RATE: 18 BRPM | WEIGHT: 233.2 LBS

## 2018-11-01 NOTE — PROGRESS NOTES
[address] 
[address], [address] [place], [address] [place] [address] Street Phone: [phone] Fax: [phone] Lam Givens : 1960 PCP: Jm Barragan MD 
2018 PROGRESS NOTE ASSESSMENT AND PLAN Leticia Dalton comes in to the office today for *** Pt will f/u in *** or sooner as needed. {There are no diagnoses linked to this encounter. (Refresh or delete this SmartLink)} Follow-up Disposition: Not on File HISTORY OF PRESENT ILLNESS Leticia Dalton is a 60 y.o. male. A&P / HPI from 2017: 
Leticia Dalton comes in to the office today for a prn f/u. He comes in today because he had a sudden flare up of lumbar pain 4 days ago that wraps around to his flanks bilaterally. The area is tender to palpation and he has pain with back flexion. This pain may be due to his moderately severe lumbar stenosis at L2-3.  
   
He was referred for an L1-2 interlaminar injection to see if it will provide relief. We discussed a referral to PT as another option but pt declined. Pt was also prescribed Robaxin 500 mg QID per his request. 
   
Pt will f/u in 3 weeks or prn. 
  
Updates from 17: 
Pt presents for an L1-2 interlaminar injection f/u. He found great relief from the injection and is experiencing decreased pain which is much more manageable compared to his last visit.  
  
He has taken Robaxin 500 mg QID without relief. 
  
Updates from 18: 
Pt presents for PRN f/u. He reports he has lost 30lbs since his last OV. He states he switched to Dr. Tam Hendricks (from Dr. Miko Winston) for pain management, but they did not get along well, so he has not been since. He states he left pain management (or was kicked out) because he had alcohol in his UDS and he is on the liver transplant list. 
  
Today he c/o neck and upper back pain along with his chronic low back pain \"right above the screws. \" 
  
 He states he is concerned about his elevated blood pressure. He states his blood pressure is elevated due to pain. Updates from 11/01/18: 
Pt presents for *** PAST MEDICAL HISTORY Past Medical History:  
Diagnosis Date  Arrhythmia 2004 SVT  Arthritis  Asthma   
 hx of, as a child, no inhaler  Chronic pain   
 lower back pain  Cirrhosis ([address]  Depression  Encephalitis  GERD (gastroesophageal reflux disease)  Gout  Hepatitis C   
 Hypertension  Left rotator cuff tear  Lumbar postlaminectomy syndrome  Nausea & vomiting   
 pt denies nausea and vomiting after surgery today  Osteoarthritis  Osteoarthritis of left knee 10/3/2016  Weakness Past Surgical History:  
Procedure Laterality Date  ABLATION OF SVT  2004  CARDIAC SURG PROCEDURE UNLIST  CHEST SURGERY PROCEDURE UNLISTED  HX ACL RECONSTRUCTION    
 right  HX BACK SURGERY    
 4 times  HX LIVER TRANSPLANT  2015  HX LUMBAR FUSION    
 TRANSPLANT,PREP DONOR LIVER, WHOLE  February 2015 Abdiel Ho MEDICATIONS Current Outpatient Medications Medication Sig Dispense Refill  oxyCODONE IR (OXY-IR) 15 mg immediate release tablet Take 1 Tab by mouth four (4) times daily as needed for Pain. Max Daily Amount: 60 mg. 28 Tab 0  
 methocarbamol (ROBAXIN) 500 mg tablet Take 1 Tab by mouth four (4) times daily. 120 Tab 2  
 oxyCODONE IR (OXY-IR) 30 mg immediate release tablet Take 1 Tab by mouth every six (6) hours. Max Daily Amount: 120 mg. 60 Tab 0  
 venlafaxine-SR (EFFEXOR XR) 150 mg capsule Take 150 mg by mouth daily. Indications: ANXIETY WITH DEPRESSION  pregabalin (LYRICA) 75 mg capsule Take 75 mg by mouth two (2) times a day. Indications: pain  atorvastatin (LIPITOR) 10 mg tablet Take 10 mg by mouth two (2) times a day. Indications: HYPERCHOLESTEROLEMIA  clonazePAM (KLONOPIN) 0.5 mg tablet Take 1 mg by mouth two (2) times a day. Indications: anxiety  CYCLOSPORINE, MODIFIED (GENGRAF PO) Take 100 mg by mouth two (2) times a day.  MYCOPHENOLATE MOFETIL (CELLCEPT PO) Take 750 mg by mouth two (2) times a day.  Omeprazole delayed release (PRILOSEC D/R) 20 mg tablet Take 20 mg by mouth daily. Indications: GASTROESOPHAGEAL REFLUX    
 allopurinol (ZYLOPRIM) 300 mg tablet Take 300 mg by mouth daily.  thiamine (B-1) 100 mg tablet Take 100 mg by mouth daily.  lisinopril (PRINIVIL, ZESTRIL) 10 mg tablet Take 10 mg by mouth daily. Indications: HYPERTENSION    
  
 
ALLERGIES Allergies Allergen Reactions  Adhesive Itching Tears skin easily SOCIAL HISTORY Social History Socioeconomic History  Marital status:  Spouse name: Not on file  Number of children: Not on file  Years of education: Not on file  Highest education level: Not on file Social Needs  Financial resource strain: Not on file  Food insecurity - worry: Not on file  Food insecurity - inability: Not on file  Transportation needs - medical: Not on file  Transportation needs - non-medical: Not on file Occupational History  Not on file Tobacco Use  Smoking status: Never Smoker  Smokeless tobacco: Never Used Substance and Sexual Activity  Alcohol use: No  
  Alcohol/week: 0.0 oz  
  Comment: drank too much years ago; none since  Drug use: No  
  Comment: years ago - 1980's  Sexual activity: Yes Other Topics Concern  Not on file Social History Narrative  Not on file FAMILY HISTORY Family History Problem Relation Age of Onset  Heart Attack Father   
     1971  
 Heart Disease Father  Heart Disease Other  Cancer Brother REVIEW OF SYSTEMS 
ROS  
 
 
PHYSICAL EXAMINATION Visit Vitals /83 Pulse 99 Temp 97.3 °F (36.3 °C) Resp 18 Ht 6' 2\" (1.88 m) Wt 233 lb 3.2 oz (105.8 kg) SpO2 98% BMI 29.94 kg/m² Pain Assessment  9/11/2018 Location of Pain Back Pain Location Comment - Location Modifiers Left;Right Severity of Pain 8 Quality of Pain Sharp; Throbbing Quality of Pain Comment - Duration of Pain Persistent Frequency of Pain Constant Aggravating Factors Standing;Walking;Bending Aggravating Factors Comment - Limiting Behavior No  
Relieving Factors Rest  
Relieving Factors Comment - Result of Injury No  
Work-Related Injury - Type of Injury - Type of Injury Comment - Constitutional:  Well developed, well nourished, in no acute distress. Psychiatric: Affect and mood are appropriate. Integumentary: No rashes or abrasions noted on exposed areas. SPINE/MUSCULOSKELETAL EXAM 
 
Lumbar spine: No rash, ecchymosis, or gross obliquity.   
No fasciculations.   
No focal atrophy is noted.   
No pain with hip ROM.   
Range of motion is normal.  
No tenderness to palpation at the sciatic notch.   
SI joints non-tender.   
Trochanters non tender.   
   
Sensation in the bilateral legs grossly intact to light touch. 
   
Updates from 01/19/17: 
Pain with back flexion. Diffuse tenderness to palpation in the lumbar region. MOTOR:   
  Biceps  Triceps Deltoids Wrist Ext Wrist Flex Hand Intrin Right 5/5 5/5 5/5 5/5 5/5 5/5 Left 5/5 5/5 5/5 5/5 5/5 5/5 Hip Flex  Quads Hamstrings Ankle DF EHL Ankle PF Right 5/5 5/5 5/5 5/5 5/5 5/5 Left 5/5 5/5 5/5 5/5 5/5 5/5 DTRs are 2+ biceps, triceps, brachioradialis, patella, and Achilles. 
   
Negative Straight Leg raise.   
Squat not tested.   
No difficulty with tandem gait.   
   
Ambulation with single point cane. FWB.    
RADIOGRAPH Cervical MRI images taken on 8/25/2016 personally reviewed with patient: The bone marrow signal pattern is unremarkable. The cervical spine alignment is 
unremarkable. 
   
The visualized portions of the brain are unremarkable.  The spinal cord signal 
 is normal. No abnormal intrathecal or epidural lesion is detected. 
   
C2-3: Bulging disc-osteophyte along the right posterolateral aspect and to 
lesser extent along the left posterior lateral aspect. No central canal 
stenosis or foraminal stenosis.    
C3-4: Mild bulging disc-osteophyte along posterior lateral aspect. Mild 
posterior disc bulge. The AP diameter of the central canal is estimated at 0.9 
cm. Mild facet hypertrophy. Minimal borderline left foraminal narrowing. 
   
C4-5: Moderate disc bulge. Mild bulging disc-osteophyte along the left 
posterior lateral aspect. No significant foraminal narrowing. The AP diameter 
of the central canal is estimated at 0.9 cm. 
   
C5-6: Broad-based protruding disc-osteophyte along the left posterolateral 
aspect. Mild bulging disc-osteophyte along the right posterior lateral aspect.   
Moderate to pronounced left foraminal narrowing. Mild right foraminal 
narrowing. Mild disc bulge. There is a AP diameter of the central canal is 
estimated at 0.9 cm. 
   
C6-7: Broad-based disc bulge. Most pronounced along the right paracentral to 
posterolateral aspect. Mild bulging disc-osteophyte along the left 
posterolateral aspect. No significant foraminal narrowing on the left side.   
Minimal narrowing on the right. The AP diameter of the central canal is 
estimated at 0.9 cm. 
   
C7-T1: Mild bilateral facet hypertrophy. No central canal stenosis or 
foraminal stenosis. 
   
IMPRESSION IMPRESSION: 
   
1. Long segment central canal narrowing. 2. Foraminal narrowing at multiple levels of the cervical spine. 3. Degenerative changes of the cervical spine with disc-osteophyte bulge and 
disc-osteophyte protrusion. Most pronounced at C5-6 on the left side. 
   
Thoracic MRI images taken on 8/25/2016 personally reviewed with patient: 
No compression deformity is observed at T5. Subtle endplate invagination is observed at T4 and to lesser extent at T3. These findings appear stable dating 
back to at least 2012. 
   
The previously observed T11 superior aspect marrow edema has since resolved. No 
convincing evidence for significant compression deformity is detected. There is 
endplate invagination similar to the previous study. Decreased disc height is 
observed at T10-11. 
   
No acute subluxation is detected. No convincing evidence for acute fracture is 
identified. No suspicious vertebral marrow edema is appreciated. 
   
No abnormal intrathecal contents are noted.   
   
At T2-3, a focal bulging disc-osteophyte is suggested along the right posterior 
lateral aspect. Smaller subtle disc-osteophyte bulge is also suggested at T3-4 
along the right posterior lateral aspect as well. Minimal disc bulge is 
suggested at T10-11. No evidence for significant central canal stenosis or 
foraminal stenosis is detected throughout the thoracic spine. 
   
IMPRESSION IMPRESSION: 
   
1. Stable unchanged compression deformity at T5. Subtle endplate invagination 
deformities, also unchanged.   
2. Interval resolution of the T10 superior endplate and adjacent marrow edema. 3. Disc-osteophyte bulge without central canal or foraminal narrowing. 
   
Lumbar MRI images taken on 8/25/2016 personally reviewed with patient: 
Lumbar fusion with pedicle screws through L4, L5, and lumbarized S1. Incorporated bone plugs at the disc spaces. There is trace retrolisthesis of L3. Large Schmorl nodes in inferior L3 and L2 endplates with marrow edema.  No 
compression fracture. 
   
Conus shows a cystic lesion, with no enhancement measuring approximately 13 x 6 
mm, benign cyst. Mild arachnoiditis is suspected at the descending nerve roots 
in the thecal sac. 
   
T12-L1: No disc herniation, central or foraminal stenosis. 
   
L1-L2: Posterior lateral corner disc protrusion, more prominent on the left, 
 with mild effacement of left lateral recess. Moderate left foraminal narrowing 
with no definite exiting nerve root compression. Patent right foramen. 
   
L2-L3: Posterior disc bulge, flattening anterior thecal sac. Facet and 
ligamentous hypertrophy. Posterior epidural fat present. AP canal measures 5 mm, 
moderately severe central stenosis. No foraminal stenosis or exiting nerve root 
compression. Small bilateral facet joint effusion. 
   
L3-L4: Posterior disc bulge. Facet and ligamentous hypertrophy. AP canal 
measures 8 mm, mild to moderate central stenosis. Moderate bilateral foraminal 
narrowing with no definite exiting nerve root compression. 
   
L4-L5: No disc material. No central stenosis. Mild foraminal narrowing with no 
definite exiting nerve root compression. 
   
L5-S1: No disc material. Posterior endplate spondylosis. No central stenosis. Mild foraminal narrowing with no exiting nerve root compression. There is 
suggestion of conjoined left S1 and S2 nerve roots. 
   
IMPRESSION IMPRESSION: Lumbosacral fusion. Disc disease with central stenosis most severe 
at L2-L3. Arachnoiditis. Additional findings as above. *** minutes of face-to-face contact were spent with the patient during today's visit extensively discussing symptoms and treatment plan. All questions were answered. More than half of this visit today was spent on counseling.   
 
Written by Rosa M Obrien as dictated by Terry Hernandez MD

## 2019-02-20 ENCOUNTER — HOSPITAL ENCOUNTER (OUTPATIENT)
Dept: LAB | Age: 59
Discharge: HOME | End: 2019-02-20

## 2019-02-20 LAB — XX-LABCORP SPECIMEN COL,LCBCF: NORMAL

## 2019-02-20 PROCEDURE — 99001 SPECIMEN HANDLING PT-LAB: CPT

## 2019-05-29 ENCOUNTER — HOSPITAL ENCOUNTER (OUTPATIENT)
Dept: LAB | Age: 59
Discharge: HOME | End: 2019-05-29

## 2019-05-29 LAB — XX-LABCORP SPECIMEN COL,LCBCF: NORMAL

## 2019-05-29 PROCEDURE — 99001 SPECIMEN HANDLING PT-LAB: CPT

## 2020-06-18 ENCOUNTER — HOSPITAL ENCOUNTER (OUTPATIENT)
Dept: LAB | Age: 60
Discharge: HOME | End: 2020-06-18

## 2020-06-18 LAB — XX-LABCORP SPECIMEN COL,LCBCF: NORMAL

## 2020-06-18 PROCEDURE — 99001 SPECIMEN HANDLING PT-LAB: CPT

## 2021-01-13 ENCOUNTER — HOSPITAL ENCOUNTER (OUTPATIENT)
Dept: LAB | Age: 61
Discharge: HOME | End: 2021-01-13

## 2021-01-13 LAB — XX-LABCORP SPECIMEN COL,LCBCF: NORMAL

## 2021-01-13 PROCEDURE — 99001 SPECIMEN HANDLING PT-LAB: CPT

## 2021-02-01 ENCOUNTER — HOSPITAL ENCOUNTER (OUTPATIENT)
Dept: LAB | Age: 61
Discharge: HOME | End: 2021-02-01
Payer: MEDICARE

## 2021-02-01 LAB
ALBUMIN SERPL-MCNC: 3.5 G/DL (ref 3.4–5)
ALBUMIN/GLOB SERPL: 1.1 {RATIO} (ref 0.8–1.7)
ALP SERPL-CCNC: 122 U/L (ref 45–117)
ALT SERPL-CCNC: 29 U/L (ref 16–61)
ANION GAP SERPL CALC-SCNC: 3 MMOL/L (ref 3–18)
APPEARANCE UR: CLEAR
AST SERPL-CCNC: 17 U/L (ref 10–38)
BASOPHILS # BLD: 0 K/UL (ref 0–0.1)
BASOPHILS NFR BLD: 0 % (ref 0–2)
BILIRUB SERPL-MCNC: 0.5 MG/DL (ref 0.2–1)
BILIRUB UR QL: NEGATIVE
BUN SERPL-MCNC: 24 MG/DL (ref 7–18)
BUN/CREAT SERPL: 16 (ref 12–20)
CALCIUM SERPL-MCNC: 8.9 MG/DL (ref 8.5–10.1)
CHLORIDE SERPL-SCNC: 109 MMOL/L (ref 100–111)
CHOLEST SERPL-MCNC: 145 MG/DL
CO2 SERPL-SCNC: 28 MMOL/L (ref 21–32)
COLOR UR: YELLOW
CREAT SERPL-MCNC: 1.48 MG/DL (ref 0.6–1.3)
DIFFERENTIAL METHOD BLD: ABNORMAL
EOSINOPHIL # BLD: 0.3 K/UL (ref 0–0.4)
EOSINOPHIL NFR BLD: 5 % (ref 0–5)
ERYTHROCYTE [DISTWIDTH] IN BLOOD BY AUTOMATED COUNT: 13.4 % (ref 11.6–14.5)
GLOBULIN SER CALC-MCNC: 3.1 G/DL (ref 2–4)
GLUCOSE SERPL-MCNC: 91 MG/DL (ref 74–99)
GLUCOSE UR STRIP.AUTO-MCNC: NEGATIVE MG/DL
HCT VFR BLD AUTO: 39.6 % (ref 36–48)
HDLC SERPL-MCNC: 50 MG/DL (ref 40–60)
HDLC SERPL: 2.9 {RATIO} (ref 0–5)
HGB BLD-MCNC: 13.6 G/DL (ref 13–16)
HGB UR QL STRIP: NEGATIVE
KETONES UR QL STRIP.AUTO: NEGATIVE MG/DL
LDLC SERPL CALC-MCNC: 72.4 MG/DL (ref 0–100)
LEUKOCYTE ESTERASE UR QL STRIP.AUTO: NEGATIVE
LIPID PROFILE,FLP: NORMAL
LYMPHOCYTES # BLD: 1.3 K/UL (ref 0.9–3.6)
LYMPHOCYTES NFR BLD: 21 % (ref 21–52)
MCH RBC QN AUTO: 33.2 PG (ref 24–34)
MCHC RBC AUTO-ENTMCNC: 34.3 G/DL (ref 31–37)
MCV RBC AUTO: 96.6 FL (ref 74–97)
MONOCYTES # BLD: 0.6 K/UL (ref 0.05–1.2)
MONOCYTES NFR BLD: 10 % (ref 3–10)
NEUTS SEG # BLD: 3.9 K/UL (ref 1.8–8)
NEUTS SEG NFR BLD: 64 % (ref 40–73)
NITRITE UR QL STRIP.AUTO: NEGATIVE
PH UR STRIP: 6.5 [PH] (ref 5–8)
PLATELET # BLD AUTO: 145 K/UL (ref 135–420)
PMV BLD AUTO: 11.6 FL (ref 9.2–11.8)
POTASSIUM SERPL-SCNC: 4.4 MMOL/L (ref 3.5–5.5)
PROT SERPL-MCNC: 6.6 G/DL (ref 6.4–8.2)
PROT UR STRIP-MCNC: NEGATIVE MG/DL
RBC # BLD AUTO: 4.1 M/UL (ref 4.7–5.5)
SODIUM SERPL-SCNC: 140 MMOL/L (ref 136–145)
SP GR UR REFRACTOMETRY: 1.02 (ref 1–1.03)
TRIGL SERPL-MCNC: 113 MG/DL (ref ?–150)
UROBILINOGEN UR QL STRIP.AUTO: 1 EU/DL (ref 0.2–1)
VLDLC SERPL CALC-MCNC: 22.6 MG/DL
WBC # BLD AUTO: 6.2 K/UL (ref 4.6–13.2)

## 2021-02-01 PROCEDURE — 85025 COMPLETE CBC W/AUTO DIFF WBC: CPT

## 2021-02-01 PROCEDURE — 80061 LIPID PANEL: CPT

## 2021-02-01 PROCEDURE — 80053 COMPREHEN METABOLIC PANEL: CPT

## 2021-02-01 PROCEDURE — 36415 COLL VENOUS BLD VENIPUNCTURE: CPT

## 2021-02-01 PROCEDURE — 80197 ASSAY OF TACROLIMUS: CPT

## 2021-02-01 PROCEDURE — 81003 URINALYSIS AUTO W/O SCOPE: CPT

## 2021-02-04 LAB — TACROLIMUS BLD-MCNC: <1 NG/ML (ref 2–20)

## 2021-08-23 ENCOUNTER — HOSPITAL ENCOUNTER (OUTPATIENT)
Dept: LAB | Age: 61
Discharge: HOME | End: 2021-08-23
Payer: MEDICARE

## 2021-08-23 LAB
ALBUMIN SERPL-MCNC: 3.5 G/DL (ref 3.4–5)
ALBUMIN/GLOB SERPL: 1.1 {RATIO} (ref 0.8–1.7)
ALP SERPL-CCNC: 94 U/L (ref 45–117)
ALT SERPL-CCNC: 37 U/L (ref 16–61)
ANION GAP SERPL CALC-SCNC: 4 MMOL/L (ref 3–18)
AST SERPL-CCNC: 29 U/L (ref 10–38)
BASOPHILS # BLD: 0 K/UL (ref 0–0.1)
BASOPHILS NFR BLD: 1 % (ref 0–2)
BILIRUB SERPL-MCNC: 0.9 MG/DL (ref 0.2–1)
BUN SERPL-MCNC: 15 MG/DL (ref 7–18)
BUN/CREAT SERPL: 12 (ref 12–20)
CALCIUM SERPL-MCNC: 9.2 MG/DL (ref 8.5–10.1)
CHLORIDE SERPL-SCNC: 103 MMOL/L (ref 100–111)
CHOLEST SERPL-MCNC: 148 MG/DL
CO2 SERPL-SCNC: 27 MMOL/L (ref 21–32)
CREAT SERPL-MCNC: 1.24 MG/DL (ref 0.6–1.3)
DIFFERENTIAL METHOD BLD: ABNORMAL
EOSINOPHIL # BLD: 0.3 K/UL (ref 0–0.4)
EOSINOPHIL NFR BLD: 6 % (ref 0–5)
ERYTHROCYTE [DISTWIDTH] IN BLOOD BY AUTOMATED COUNT: 14.2 % (ref 11.6–14.5)
GLOBULIN SER CALC-MCNC: 3.3 G/DL (ref 2–4)
GLUCOSE SERPL-MCNC: 95 MG/DL (ref 74–99)
HCT VFR BLD AUTO: 45.2 % (ref 36–48)
HDLC SERPL-MCNC: 49 MG/DL (ref 40–60)
HDLC SERPL: 3 {RATIO} (ref 0–5)
HGB BLD-MCNC: 15.3 G/DL (ref 13–16)
LDLC SERPL CALC-MCNC: 65 MG/DL (ref 0–100)
LIPID PROFILE,FLP: ABNORMAL
LYMPHOCYTES # BLD: 1.2 K/UL (ref 0.9–3.6)
LYMPHOCYTES NFR BLD: 24 % (ref 21–52)
MCH RBC QN AUTO: 31.2 PG (ref 24–34)
MCHC RBC AUTO-ENTMCNC: 33.8 G/DL (ref 31–37)
MCV RBC AUTO: 92.1 FL (ref 74–97)
MONOCYTES # BLD: 0.6 K/UL (ref 0.05–1.2)
MONOCYTES NFR BLD: 11 % (ref 3–10)
NEUTS SEG # BLD: 2.9 K/UL (ref 1.8–8)
NEUTS SEG NFR BLD: 58 % (ref 40–73)
PLATELET # BLD AUTO: 153 K/UL (ref 135–420)
PMV BLD AUTO: 10.8 FL (ref 9.2–11.8)
POTASSIUM SERPL-SCNC: 4.2 MMOL/L (ref 3.5–5.5)
PROT SERPL-MCNC: 6.8 G/DL (ref 6.4–8.2)
RBC # BLD AUTO: 4.91 M/UL (ref 4.35–5.65)
SODIUM SERPL-SCNC: 134 MMOL/L (ref 136–145)
TRIGL SERPL-MCNC: 170 MG/DL (ref ?–150)
VLDLC SERPL CALC-MCNC: 34 MG/DL
WBC # BLD AUTO: 5 K/UL (ref 4.6–13.2)

## 2021-08-23 PROCEDURE — 80053 COMPREHEN METABOLIC PANEL: CPT

## 2021-08-23 PROCEDURE — 85025 COMPLETE CBC W/AUTO DIFF WBC: CPT

## 2021-08-23 PROCEDURE — 80158 DRUG ASSAY CYCLOSPORINE: CPT

## 2021-08-23 PROCEDURE — 80197 ASSAY OF TACROLIMUS: CPT

## 2021-08-23 PROCEDURE — 80061 LIPID PANEL: CPT

## 2021-08-23 PROCEDURE — 36415 COLL VENOUS BLD VENIPUNCTURE: CPT

## 2021-08-25 LAB — CYCLOSPORINE BLD IA-MCNC: <25 NG/ML (ref 100–400)

## 2021-08-26 LAB — TACROLIMUS BLD-MCNC: <1 NG/ML (ref 2–20)

## 2022-03-11 ENCOUNTER — TRANSCRIBE ORDER (OUTPATIENT)
Dept: SCHEDULING | Age: 62
End: 2022-03-11

## 2022-09-13 ENCOUNTER — TRANSCRIBE ORDER (OUTPATIENT)
Dept: SCHEDULING | Age: 62
End: 2022-09-13

## 2022-09-13 DIAGNOSIS — Z94.4 LIVER TRANSPLANTED (HCC): ICD-10-CM

## 2022-09-13 DIAGNOSIS — K43.9 VENTRAL HERNIA WITHOUT OBSTRUCTION OR GANGRENE: ICD-10-CM

## 2022-09-13 DIAGNOSIS — R10.9 UNSPECIFIED ABDOMINAL PAIN: Primary | ICD-10-CM

## 2022-09-20 ENCOUNTER — HOSPITAL ENCOUNTER (OUTPATIENT)
Age: 62
Discharge: HOME | End: 2022-09-20
Attending: INTERNAL MEDICINE
Payer: MEDICARE

## 2022-09-20 DIAGNOSIS — K43.9 VENTRAL HERNIA WITHOUT OBSTRUCTION OR GANGRENE: ICD-10-CM

## 2022-09-20 DIAGNOSIS — R10.9 UNSPECIFIED ABDOMINAL PAIN: ICD-10-CM

## 2022-09-20 DIAGNOSIS — Z94.4 LIVER TRANSPLANTED (HCC): ICD-10-CM

## 2022-09-20 PROCEDURE — 74181 MRI ABDOMEN W/O CONTRAST: CPT

## 2023-02-16 RX ORDER — LANOLIN ALCOHOL/MO/W.PET/CERES
100 CREAM (GRAM) TOPICAL DAILY
COMMUNITY

## 2023-02-16 RX ORDER — OMEPRAZOLE 20 MG/1
20 TABLET, DELAYED RELEASE ORAL DAILY
COMMUNITY

## 2023-02-16 RX ORDER — VENLAFAXINE HYDROCHLORIDE 150 MG/1
150 CAPSULE, EXTENDED RELEASE ORAL DAILY
COMMUNITY

## 2023-02-16 RX ORDER — OXYCODONE HYDROCHLORIDE 15 MG/1
15 TABLET ORAL 4 TIMES DAILY PRN
COMMUNITY
Start: 2018-09-11

## 2023-02-16 RX ORDER — METHOCARBAMOL 500 MG/1
500 TABLET, FILM COATED ORAL 4 TIMES DAILY
COMMUNITY
Start: 2017-01-19

## 2023-02-16 RX ORDER — ATORVASTATIN CALCIUM 10 MG/1
10 TABLET, FILM COATED ORAL 2 TIMES DAILY
COMMUNITY

## 2023-02-16 RX ORDER — OXYCODONE HYDROCHLORIDE 30 MG/1
30 TABLET ORAL EVERY 6 HOURS
COMMUNITY
Start: 2016-10-05

## 2023-02-16 RX ORDER — LISINOPRIL 10 MG/1
10 TABLET ORAL DAILY
COMMUNITY

## 2023-02-16 RX ORDER — PREGABALIN 75 MG/1
75 CAPSULE ORAL 2 TIMES DAILY
COMMUNITY

## 2023-02-16 RX ORDER — ALLOPURINOL 300 MG/1
300 TABLET ORAL DAILY
COMMUNITY

## 2023-02-16 RX ORDER — CLONAZEPAM 0.5 MG/1
1 TABLET ORAL 2 TIMES DAILY
COMMUNITY

## (undated) DEVICE — (D)BNDG ADHESIVE FABRIC 3/4X3 -- DISC BY MFR USE ITEM 357960

## (undated) DEVICE — (D)SYR 10ML 1/5ML GRAD NSAF -- PKGING CHANGE USE ITEM 338027

## (undated) DEVICE — Device: Brand: MEDEX

## (undated) DEVICE — MEDIA CONTRAST 10ML 200MG/ML 41%

## (undated) DEVICE — SET EPI 18GA L3.5IN TUOHY NDL W/ 20GA CLS TIP NYL CATH